# Patient Record
Sex: MALE | Race: WHITE | NOT HISPANIC OR LATINO | Employment: OTHER | URBAN - NONMETROPOLITAN AREA
[De-identification: names, ages, dates, MRNs, and addresses within clinical notes are randomized per-mention and may not be internally consistent; named-entity substitution may affect disease eponyms.]

---

## 2018-08-23 ENCOUNTER — TRANSFERRED RECORDS (OUTPATIENT)
Dept: HEALTH INFORMATION MANAGEMENT | Facility: OTHER | Age: 78
End: 2018-08-23

## 2018-08-26 ENCOUNTER — TRANSFERRED RECORDS (OUTPATIENT)
Dept: HEALTH INFORMATION MANAGEMENT | Facility: OTHER | Age: 78
End: 2018-08-26

## 2018-08-26 ENCOUNTER — MEDICAL CORRESPONDENCE (OUTPATIENT)
Dept: HEALTH INFORMATION MANAGEMENT | Facility: OTHER | Age: 78
End: 2018-08-26

## 2018-08-28 DIAGNOSIS — I48.20 CHRONIC ATRIAL FIBRILLATION (H): ICD-10-CM

## 2018-08-28 DIAGNOSIS — L60.0 INGROWN NAIL: ICD-10-CM

## 2018-08-28 DIAGNOSIS — E11.69 DIABETES MELLITUS TYPE 2 IN OBESE: ICD-10-CM

## 2018-08-28 DIAGNOSIS — C44.91 BASAL CELL CARCINOMA: ICD-10-CM

## 2018-08-28 DIAGNOSIS — R00.8 TRIGEMINY: ICD-10-CM

## 2018-08-28 DIAGNOSIS — I10 HYPERTENSION: ICD-10-CM

## 2018-08-28 DIAGNOSIS — E66.9 DIABETES MELLITUS TYPE 2 IN OBESE: ICD-10-CM

## 2018-08-28 DIAGNOSIS — I49.8 BIGEMINY: ICD-10-CM

## 2018-08-28 DIAGNOSIS — I48.91 ATRIAL FIBRILLATION (H): ICD-10-CM

## 2018-08-28 DIAGNOSIS — E78.5 HYPERLIPIDEMIA: ICD-10-CM

## 2018-08-28 DIAGNOSIS — I50.30 HEART FAILURE WITH PRESERVED EJECTION FRACTION (H): ICD-10-CM

## 2018-08-28 RX ORDER — OMEPRAZOLE/SODIUM BICARBONATE 20MG-1.1G
1 CAPSULE ORAL
COMMUNITY
End: 2021-10-15

## 2018-08-28 RX ORDER — ASCORBIC ACID 500 MG
500 TABLET ORAL 3 TIMES DAILY
COMMUNITY

## 2018-08-28 RX ORDER — LOSARTAN POTASSIUM 50 MG/1
100 TABLET ORAL
COMMUNITY
End: 2021-10-15

## 2018-08-28 RX ORDER — GABAPENTIN 100 MG/1
100 CAPSULE ORAL 3 TIMES DAILY
COMMUNITY

## 2018-08-28 RX ORDER — MAGNESIUM GLUCONATE 30 MG(550)
TABLET ORAL
COMMUNITY
End: 2021-10-15

## 2018-08-28 RX ORDER — FUROSEMIDE 20 MG
20 TABLET ORAL
Status: ON HOLD | COMMUNITY
End: 2018-11-13

## 2018-08-28 RX ORDER — LANOLIN ALCOHOL/MO/W.PET/CERES
400 CREAM (GRAM) TOPICAL
COMMUNITY
End: 2021-10-15

## 2018-08-28 RX ORDER — DIPHENOXYLATE HYDROCHLORIDE AND ATROPINE SULFATE 2.5; .025 MG/1; MG/1
TABLET ORAL
COMMUNITY
End: 2021-10-15

## 2018-08-28 RX ORDER — FLOMAX 0.4 MG/1
0.4 CAPSULE ORAL 2 TIMES DAILY
COMMUNITY

## 2018-08-28 RX ORDER — SIMVASTATIN 10 MG
10 TABLET ORAL
COMMUNITY

## 2018-09-05 ENCOUNTER — OFFICE VISIT (OUTPATIENT)
Dept: UROLOGY | Facility: OTHER | Age: 78
End: 2018-09-05
Attending: UROLOGY
Payer: MEDICARE

## 2018-09-05 VITALS — DIASTOLIC BLOOD PRESSURE: 60 MMHG | WEIGHT: 270 LBS | SYSTOLIC BLOOD PRESSURE: 110 MMHG

## 2018-09-05 DIAGNOSIS — E66.9 DIABETES MELLITUS TYPE 2 IN OBESE: ICD-10-CM

## 2018-09-05 DIAGNOSIS — R33.8 BENIGN PROSTATIC HYPERPLASIA WITH URINARY RETENTION: Primary | ICD-10-CM

## 2018-09-05 DIAGNOSIS — N40.1 BENIGN PROSTATIC HYPERPLASIA WITH URINARY RETENTION: Primary | ICD-10-CM

## 2018-09-05 DIAGNOSIS — E11.69 DIABETES MELLITUS TYPE 2 IN OBESE: ICD-10-CM

## 2018-09-05 PROCEDURE — G0463 HOSPITAL OUTPT CLINIC VISIT: HCPCS

## 2018-09-05 PROCEDURE — 51798 US URINE CAPACITY MEASURE: CPT | Performed by: UROLOGY

## 2018-09-05 PROCEDURE — 99204 OFFICE O/P NEW MOD 45 MIN: CPT | Mod: 25 | Performed by: UROLOGY

## 2018-09-05 PROCEDURE — G0463 HOSPITAL OUTPT CLINIC VISIT: HCPCS | Mod: 25

## 2018-09-05 PROCEDURE — 51700 IRRIGATION OF BLADDER: CPT | Performed by: UROLOGY

## 2018-09-05 RX ORDER — FINASTERIDE 5 MG/1
5 TABLET, FILM COATED ORAL DAILY
Qty: 90 TABLET | Refills: 3 | Status: SHIPPED | OUTPATIENT
Start: 2018-09-05

## 2018-09-05 ASSESSMENT — PAIN SCALES - GENERAL: PAINLEVEL: NO PAIN (0)

## 2018-09-05 NOTE — NURSING NOTE
Pt presents to clinic for consult on urinary retention    Review of Systems:    Weight loss:    Yes     Recent fever/chills:  No   Night sweats:   No  Current skin rash:  No   Recent hair loss:  No  Heat intolerance:  No   Cold intolerance:  No  Chest pain:   Yes   Palpitations:   No  Shortness of breath:  Yes   Wheezing:   Yers  Constipation:    No   Diarrhea:   No   Nausea:   No   Vomiting:   No   Kidney/side pain:  No   Back pain:   No  Frequent headaches:  No   Dizziness:     No  Leg swelling:   Yes   Calf pain:    No    Parents, brothers or sisters with history of kidney cancer:   No  Parents, brothers or sisters with history of bladder cancer: No  Father or brother with history of prostate cancer:  No

## 2018-09-05 NOTE — MR AVS SNAPSHOT
After Visit Summary   9/5/2018    Rupesh Muller    MRN: [id number]           Patient Information     Date Of Birth          1940        Visit Information        Provider Department      9/5/2018 8:30 AM Jarad Richey MD [facility] [place] [facility]        Today's Diagnoses     Benign prostatic hyperplasia with urinary retention    -  1    Diabetes mellitus type 2 in obese (H)           Follow-ups after your visit        Who to contact     If you have questions or need follow up information about today's clinic visit or your schedule please contact [facility] directly at [phone].  Normal or non-critical lab and imaging results will be communicated to you by MyChart, letter or phone within 4 business days after the clinic has received the results. If you do not hear from us within 7 days, please contact the clinic through [employer] or phone. If you have a critical or abnormal lab result, we will notify you by phone as soon as possible.  Submit refill requests through [employer] or call your pharmacy and they will forward the refill request to us. Please allow 3 business days for your refill to be completed.          Additional Information About Your Visit        Care EveryWhere ID     This is your Care EveryWhere ID. This could be used by other organizations to access your [facility] medical records  [id number]         Blood Pressure from Last 3 Encounters:   09/05/18 110/60    Weight from Last 3 Encounters:   09/05/18 122.5 kg (270 lb)              We Performed the Following     IRRIGATION BLADDER SIMPLE LAVAGE/INSTILLATION     POST-VOID RESIDUAL BLADDER SCAN          Today's Medication Changes          These changes are accurate as of 9/5/18 12:28 PM.  If you have any questions, ask your nurse or doctor.               Start taking these medicines.        Dose/Directions    finasteride 5 MG tablet   Commonly known as:  PROSCAR   Used for:  Benign prostatic  hyperplasia with urinary retention   Started by:  Jarad Richey MD        Dose:  5 mg   Take 1 tablet (5 mg) by mouth daily   Quantity:  90 tablet   Refills:  3            Where to get your medicines      These medications were sent to [facility] [address] [facility] [address] [facility] [address] [facility] [address]     Phone:  [phone]     finasteride 5 MG tablet                Primary Care Provider Office Phone # Fax #    Oskar Casiano [phone]       [place] [address] [place] [address]        Equal Access to Services     [facility]: Hadii aad ku hadasho Soomaali, waaxda luqadaha, qaybta kaalmada adeegyada, waxnish martinezin hayaan ademarino moran . So [facility] [phone].    ATENCIÓN: Si habla español, tiene a cardenas disposición servicios gratuitos de asistencia lingüística. Ll[facility] [phone].    We comply with applicable federal civil rights laws and [place] laws. We do not discriminate on the basis of race, color, national origin, age, disability, sex, sexual orientation, or gender identity.            Thank you!     Thank you for choosing [facility] AND [facility]  for your care. Our goal is always to provide you with excellent care. Hearing back from our patients is one way we can continue to improve our services. Please take a few minutes to complete the written survey that you may receive in the mail after your visit with us. Thank you!             Your Updated Medication List - Protect others around you: Learn how to safely use, store and throw away your medicines at www.disposemymeds.org.          This list is accurate as of 9/5/18 12:28 PM.  Always use your most recent med list.                   Brand Name Dispense Instructions for use Diagnosis    ascorbic acid 500 MG tablet    VITAMIN C     Take 500 mg by mouth        finasteride 5 MG tablet    PROSCAR    90 tablet    Take 1 tablet (5 mg) by mouth daily    Benign  prostatic hyperplasia with urinary retention       FLOMAX 0.4 MG capsule   Generic drug:  tamsulosin      Take 0.4 mg by mouth daily        folic acid 400 MCG tablet    FOLVITE     Take 400 mcg by mouth        furosemide 20 MG tablet    LASIX     Take 20 mg by mouth        gabapentin 100 MG capsule    NEURONTIN     Take 100 mg by mouth 3 times daily        losartan 50 MG tablet    COZAAR     Take 50 mg by mouth        metFORMIN 1000 MG tablet    GLUCOPHAGE     Take 1,000 mg by mouth        MULTI-VITAMINS Tabs           omeprazole-sodium bicarbonate  MG Caps per capsule      Take 1 capsule by mouth every morning (before breakfast)        RA POTASSIUM GLUCONATE 595 (99 K) MG Tabs   Generic drug:  Potassium Gluconate           rivaroxaban ANTICOAGULANT 20 MG Tabs tablet    XARELTO     Take 20 mg by mouth        simvastatin 10 MG tablet    ZOCOR     Take 10 mg by mouth

## 2018-09-05 NOTE — PROGRESS NOTES
I was asked to see this patient by Dr Gomez and provide my opinion about the following:  Urinary retention    Type of Visit  Consult    Chief Complaint  Urinary retention    HPI  Mr Muller is a 78 year old male with a history of diabetes among other medical problems who presents with urinary retention.  The patient has had ongoing issues with urinary retention requiring catheterization for the past month or so.  He has undergone multiple voiding trials as well as intermittent catheterization over this timeframe.  Catheter was placed last week.  He has not had catheters placed in the past.  He has not previously had prostate surgery.  He is currently on the following medications for urinary complaints: Waddy.  Records from the outside hospital were reviewed today.      Past Medical History  He  has a past medical history of Atrial fibrillation (H) with ventricular ectopic beats; Basal cell carcinoma nodular type (09/27/2012); Bigeminy; Chronic atrial fibrillation with prolonged pauses; chronic recurrent ingrown toe nails; Diabetes mellitus type 2 in obese (H); Heart failure with preserved ejection fraction (H); Hyperlipidemia; Hypertension; Peripheral neuropathy (08/2006); and Trigeminy epidsodes.  Patient Active Problem List   Diagnosis     Diabetes mellitus type 2 in obese (H)     Hyperlipidemia     Hypertension     chronic recurrent ingrown toe nails     Basal cell carcinoma nodular type     Atrial fibrillation (H) with ventricular ectopic beats     Bigeminy     Trigeminy epidsodes     Chronic atrial fibrillation with prolonged pauses     Heart failure with preserved ejection fraction (H)     Benign prostatic hyperplasia with urinary retention       Past Surgical History  He  has a past surgical history that includes hernia repair (1985) and descended right testicle (1985).    Medications  He has a current medication list which includes the following prescription(s): finasteride, ascorbic acid, flomax, folic  acid, furosemide, gabapentin, losartan, metformin, multi-vitamins, omeprazole-sodium bicarbonate, potassium gluconate, rivaroxaban anticoagulant, and simvastatin.    Allergies  Allergies   Allergen Reactions     No Clinical Screening - See Comments Itching     CT scan in [place] about 30 years ago       Social History  He  reports that he has quit smoking. He has never used smokeless tobacco.  No drug abuse.    Family History  History reviewed. No pertinent family history.    Review of Systems  I personally reviewed the ROS with the patient.    Nursing Notes:   Soheila Lucas LPN  9/5/2018  9:37 AM  Signed  Pt presents to clinic for consult on urinary retention    Review of Systems:    Weight loss:    Yes     Recent fever/chills:  No   Night sweats:   No  Current skin rash:  No   Recent hair loss:  No  Heat intolerance:  No   Cold intolerance:  No  Chest pain:   Yes   Palpitations:   No  Shortness of breath:  Yes   Wheezing:   Yers  Constipation:    No   Diarrhea:   No   Nausea:   No   Vomiting:   No   Kidney/side pain:  No   Back pain:   No  Frequent headaches:  No   Dizziness:     No  Leg swelling:   Yes   Calf pain:    No    Parents, brothers or sisters with history of kidney cancer:   No  Parents, brothers or sisters with history of bladder cancer: No  Father or brother with history of prostate cancer:  No      Soheila Lucas LPN  9/5/2018  9:55 AM  Signed  Void Trial  Verbal order read back by Jarad Richey MD to perform void trial and post void residual bladder scan.  Patient's bladder was filled under gravity with 225mL of sterile saline.  Patient voided 0mL.  Post-void residual was measured by ultrasonic bladder scanner: >302 mL      Soheila Lucas LPN  9/5/2018 11:37 AM  Unsigned  Post-Void Residual  A post-void residual was measured by ultrasonic bladder scanner.  >166 mL      Physical Exam  Vitals:    09/05/18 0835   BP: 110/60   BP Location: Left arm   Patient Position: Sitting   Cuff  Size: Adult Large   Weight: 122.5 kg (270 lb)   Constitutional: No acute distress.  Alert and cooperative   Head: NCAT  Eyes: Conjunctivae normal  Cardiovascular: Regular rate.  Pulmonary/Chest: Respirations are even and non-labored bilaterally, no audible wheezing  Abdominal: Soft. No distension, tenderness, masses or guarding.   Neurological: A + O x 3.  Cranial Nerves II-XII grossly intact.  Extremities: ALINE x 4, Warm. No clubbing.  No cyanosis.    Skin: Pink, warm and dry.  No visible rashes noted.  Psychiatric:  Normal mood and affect  Back:  No left CVA tenderness.  No right CVA tenderness.  Genitourinary:  Catheter in place draining clear urine    Post-Void Residual  A post-void residual was measured by ultrasonic bladder scanner.  166mL    Assessment  Mr Muller is a 78 year old male with a history of diabetes among other medical problems who presents with urinary retention.    Discussed his clinical scenario.  He was able to successfully void today.  I would recommend a PVR at his next primary care visit in the next 1-2 weeks.  Also discussed adding finasteride.    Plan  Continue Flomax  Start finasteride 5 mg once daily  If the catheter needs to be replaced I will see him in follow-up for cystoscopy and plans for possible TURP

## 2018-09-05 NOTE — NURSING NOTE
Void Trial  Verbal order read back by Jarad Richey MD to perform void trial and post void residual bladder scan.  Patient's bladder was filled under gravity with 225mL of sterile saline.  Patient voided 0mL.  Post-void residual was measured by ultrasonic bladder scanner: >302 mL

## 2018-09-17 ENCOUNTER — OFFICE VISIT (OUTPATIENT)
Dept: UROLOGY | Facility: OTHER | Age: 78
End: 2018-09-17
Attending: UROLOGY
Payer: MEDICARE

## 2018-09-17 VITALS — HEART RATE: 64 BPM | RESPIRATION RATE: 16 BRPM | TEMPERATURE: 96.4 F

## 2018-09-17 DIAGNOSIS — R33.9 URINARY RETENTION WITH INCOMPLETE BLADDER EMPTYING: Primary | ICD-10-CM

## 2018-09-17 PROCEDURE — 52000 CYSTOURETHROSCOPY: CPT | Performed by: UROLOGY

## 2018-09-17 PROCEDURE — 88112 CYTOPATH CELL ENHANCE TECH: CPT

## 2018-09-17 PROCEDURE — 99214 OFFICE O/P EST MOD 30 MIN: CPT | Mod: 25 | Performed by: UROLOGY

## 2018-09-17 PROCEDURE — G0463 HOSPITAL OUTPT CLINIC VISIT: HCPCS | Mod: 25

## 2018-09-17 PROCEDURE — 51700 IRRIGATION OF BLADDER: CPT | Performed by: UROLOGY

## 2018-09-17 ASSESSMENT — PAIN SCALES - GENERAL: PAINLEVEL: NO PAIN (0)

## 2018-09-17 NOTE — MR AVS SNAPSHOT
After Visit Summary   9/17/2018    Rupesh Muller    MRN: [id number]           Patient Information     Date Of Birth          1940        Visit Information        Provider Department      9/17/2018 1:00 PM Jarad Richey MD [facility]        Care Instructions    Home Care after Cystoscopy  Follow these guidelines for your care after your procedure.    Activity  No limitations    Bathing or showering  No limitations    Symptoms  You may notice some burning with urination but this usually resolves after 1-2 days.  You may also notice small amounts of blood in your urine.  Please increase water intake for the next few days to help with these symptoms.    Contacts  General Questions: [phone]  Appointments:  [phone]  Emergencies:  [phone]    When to call the clinic  If you develop any of the following symptoms please call the clinic immediately.  If the clinic is closed please be seen at an urgent care clinic or the Emergency Department.  - Burning with urination that worsens after 2 days  - Unable to urinate causing severe pelvic pain  - Fevers of greater than 101 degrees F  - Flank pain that is not responding to pain medication    Follow up  Please follow up as discussed at the appointment.          Follow-ups after your visit        Who to contact     If you have questions or need follow up information about today's clinic visit or your schedule please contact [facility] AND [facility] directly at [phone].  Normal or non-critical lab and imaging results will be communicated to you by MyChart, letter or phone within 4 business days after the clinic has received the results. If you do not hear from us within 7 days, please contact the clinic through MyChart or phone. If you have a critical or abnormal lab result, we will notify you by phone as soon as possible.  Submit refill requests through [employer] or call your pharmacy and they will forward the  refill request to us. Please allow 3 business days for your refill to be completed.          Additional Information About Your Visit        Care EveryWhere ID     This is your Care EveryWhere ID. This could be used by other organizations to access your [facility] medical records  [id number]        Your Vitals Were     Pulse Temperature Respirations             64 96.4  F (35.8  C) (Tympanic) 16          Blood Pressure from Last 3 Encounters:   09/05/18 110/60    Weight from Last 3 Encounters:   09/05/18 122.5 kg (270 lb)              Today, you had the following     No orders found for display       Primary Care Provider Office Phone # Fax #    Oskar Casiano [phone]       [facility] [address] [place] [address]        Equal Access to Services     [facility] : Hadii demetria martin hadasho Soomaali, waaxda luqadaha, qaybta kaalmada adeegyada, waxay juanin lili moran . So [facility] [phone].    ATENCIÓN: Si habla español, tiene a cardenas disposición servicios gratuitos de asistencia lingüística. [facility] [phone].    We comply with applicable federal civil rights laws and [place] laws. We do not discriminate on the basis of race, color, national origin, age, disability, sex, sexual orientation, or gender identity.            Thank you!     Thank you for choosing [facility]  for your care. Our goal is always to provide you with excellent care. Hearing back from our patients is one way we can continue to improve our services. Please take a few minutes to complete the written survey that you may receive in the mail after your visit with us. Thank you!             Your Updated Medication List - Protect others around you: Learn how to safely use, store and throw away your medicines at www.disposemymeds.org.          This list is accurate as of 9/17/18  1:20 PM.  Always use your most recent med list.                   Brand Name Dispense Instructions  for use Diagnosis    ascorbic acid 500 MG tablet    VITAMIN C     Take 500 mg by mouth        finasteride 5 MG tablet    PROSCAR    90 tablet    Take 1 tablet (5 mg) by mouth daily    Benign prostatic hyperplasia with urinary retention       FLOMAX 0.4 MG capsule   Generic drug:  tamsulosin      Take 0.4 mg by mouth daily        folic acid 400 MCG tablet    FOLVITE     Take 400 mcg by mouth        furosemide 20 MG tablet    LASIX     Take 20 mg by mouth        gabapentin 100 MG capsule    NEURONTIN     Take 100 mg by mouth 3 times daily        losartan 50 MG tablet    COZAAR     Take 50 mg by mouth        metFORMIN 1000 MG tablet    GLUCOPHAGE     Take 1,000 mg by mouth        MULTI-VITAMINS Tabs           omeprazole-sodium bicarbonate  MG Caps per capsule      Take 1 capsule by mouth every morning (before breakfast)        RA POTASSIUM GLUCONATE 595 (99 K) MG Tabs   Generic drug:  Potassium Gluconate           rivaroxaban ANTICOAGULANT 20 MG Tabs tablet    XARELTO     Take 20 mg by mouth        simvastatin 10 MG tablet    ZOCOR     Take 10 mg by mouth

## 2018-09-17 NOTE — PROGRESS NOTES
PCP:  Dr Vish Casiano    Preprocedure diagnosis  Urinary retention    Postprocedure diagnosis  Urinary retention  Papillary lesions of bladder (bladder irritation versus tumor)    Procedure  Flexible Cystourethroscopy and bladder barbotage    Surgeon  Jarad Richey MD    Anesthesia  2% lidocaine jelly intraurethrally    Complications  None    Indications  78 year old male undergoing a flexible cystoscopy for the above mentioned indications.  Patient had an indwelling catheter prior to the procedure    Findings  Cystoscopic findings included a normal anterior urethra.    There was not a prominent median lobe.    The lateral lobes were obstructive in appearance.  The bladder appeared to be normal capacity.    There were no tumors, stones or foreign bodies.    The orifices were slit-shaped and in their normal location.    Procedure  The patient was placed in supine position and prepped and draped in sterile fashion with lidocaine jelly per urethra for anesthesia after removing the indwelling catheter.  I passed a lubricated 14F flexible cystoscope through the penile urethra and into the bladder and the bladder was completely visualized.    A bladder barbotage was performed and urine collected and sent for cytology.    The cystoscope was retroflexed and the bladder neck and prostate visualized.    The cystoscope was slowly withdrawn while visualizing the urethra and the procedure terminated.    The patient tolerated the procedure well.      Assessment  Urinary retention -patient is on maximal medical therapy and failed last void trial.  We discussed surgical options and the patient is interested in TURP.  Given his cardiovascular history it would be safest to await the results of upcoming testing.  We also would need to make anticoagulation/antiplatelet plans.    Bladder lesions -in this scenario most likely the bladder lesions represent catheter edema.  However, the appearance of the lesions today did not have  "characteristic catheter edema appearance.  I will await the urine cytology to help with this.    Discussed continuing medications and indwelling catheterization versus TUVP for his urinary retention.  He is unable to perform self catheter position.  The procedure was described to the patient.    It was explained to the patient that due to his urinary retention there is no guarantee he will be able to empty following the prostate procedure.    Possible complications and side effects were discussed with the patient, including but not limited to, infection or sepsis, bleeding, irritative voiding symptoms, injury to the ureter, injury to adjacent organs and incontinence.  Anesthesia related risks were also discussed such as cardiovascular and pulmonary complications.      All patient's questions were answered, and the patient was consented.     Plan  The patient was consented for \"transurethral resection of prostate\" under choice anesthesia following results from his upcoming cardiology appointment.  Preop through PCP is appreciated for perioperative medication management and anticoagulant/antiplatelet planning.          I spent 25 minutes on this patient's visit (exclusive of separately billed services/procedures) and over half of this time was spent in face-to-face counseling regarding his diagnosis, treatment options with emphasis on  risks and benefits of each, prognosis and importance of compliance.    "

## 2018-09-17 NOTE — NURSING NOTE
Patient positioned in supine position, perineum area prepped with chlorhexidene Gluconate and patient draped per sterile technique. Per verbal order read back by Jarad Richey MD remove islas catheter and Urojet 10mL 2% lidocaine jelly to be instilled into urethra.  Islas catheter removed. Urojet- 10ml 2% Lidocaine jelly instilled into the urethra.    Urojet 2%  Lot#: [id number]  Expiration date: 4/20  : [employer]  NDC: [address]-1    [facility] Protocol    A. Pre-procedure verification complete Yes  1-relevant information / documentation available, reviewed and properly matched to the patient; 2-consent accurate and complete, 3-equipment and supplies available    B. Site marking complete N/A  Site marked if not in continuous attendance with patient    C. TIME OUT completed Yes  Time Out was conducted just prior to starting procedure to verify the eight required elements: 1-patient identity, 2-consent accurate and complete, 3-position, 4-correct side/site marked (if applicable), 5-procedure, 6-relevant images / results properly labeled and displayed (if applicable), 7-antibiotics / irrigation fluids (if applicable), 8-safety precautions.    After procedure perineum area rinsed. Discharge instructions reviewed with patient. Patient verbalized understanding of discharge instructions and discharged ambulatory.  Jeanna Haddad..................9/17/2018  1:57 PM

## 2018-09-17 NOTE — PATIENT INSTRUCTIONS

## 2018-09-17 NOTE — LETTER
Oskar Casiano MD  [address] [facility] [address]  [place] [address]        September 17, 2018  MRN: [id number]    Dear Dr. Casiano,          Thank you for allowing me to take part in this patient s care.  I copied my note below from today's clinic visit for your records.  I am hoping to coordinate possible TURP following the completion of his cardiac workup.  I have requested preoperative H&P and I would like to thank you in advance for this as well as assistance with an anticoagulant/antiplatelet plan.  Please feel free to contact me with any questions      Warm regards,            Jarad Richey MD, PharmD, FACS  Urologist  [address] [place] [address]  Appointments: [phone]                          PCP:  Dr Vish Casiano    Preprocedure diagnosis  Urinary retention    Postprocedure diagnosis  Urinary retention  Papillary lesions of bladder (bladder irritation versus tumor)    Procedure  Flexible Cystourethroscopy and bladder barbotage    Surgeon  Jarad Richey MD    Anesthesia  2% lidocaine jelly intraurethrally    Complications  None    Indications  78 year old male undergoing a flexible cystoscopy for the above mentioned indications.  Patient had an indwelling catheter prior to the procedure    Findings  Cystoscopic findings included a normal anterior urethra.    There was not a prominent median lobe.    The lateral lobes were obstructive in appearance.  The bladder appeared to be normal capacity.    There were no tumors, stones or foreign bodies.    The orifices were slit-shaped and in their normal location.    Procedure  The patient was placed in supine position and prepped and draped in sterile fashion with lidocaine jelly per urethra for anesthesia after removing the indwelling catheter.  I passed a lubricated 14F flexible cystoscope through the penile urethra and into the bladder and the bladder was completely visualized.    A bladder barbotage was performed  "and urine collected and sent for cytology.    The cystoscope was retroflexed and the bladder neck and prostate visualized.    The cystoscope was slowly withdrawn while visualizing the urethra and the procedure terminated.    The patient tolerated the procedure well.      Assessment  Urinary retention -patient is on maximal medical therapy and failed last void trial.  We discussed surgical options and the patient is interested in TURP.  Given his cardiovascular history it would be safest to await the results of upcoming testing.  We also would need to make anticoagulation/antiplatelet plans.    Bladder lesions -in this scenario most likely the bladder lesions represent catheter edema.  However, the appearance of the lesions today did not have characteristic catheter edema appearance.  I will await the urine cytology to help with this.    Discussed continuing medications and indwelling catheterization versus TUVP for his urinary retention.  He is unable to perform self catheter position.  The procedure was described to the patient.    It was explained to the patient that due to his urinary retention there is no guarantee he will be able to empty following the prostate procedure.    Possible complications and side effects were discussed with the patient, including but not limited to, infection or sepsis, bleeding, irritative voiding symptoms, injury to the ureter, injury to adjacent organs and incontinence.  Anesthesia related risks were also discussed such as cardiovascular and pulmonary complications.      All patient's questions were answered, and the patient was consented.     Plan  The patient was consented for \"transurethral resection of prostate\" under choice anesthesia following results from his upcoming cardiology appointment.  Preop through PCP is appreciated for perioperative medication management and anticoagulant/antiplatelet planning.  "

## 2018-09-20 ENCOUNTER — TELEPHONE (OUTPATIENT)
Dept: UROLOGY | Facility: OTHER | Age: 78
End: 2018-09-20

## 2018-09-20 NOTE — TELEPHONE ENCOUNTER
aPty is patient's primary care provider's nurse.  She verified patient's full name and date of birth.  She states that patient's pre-op is 9/25/15, echo is 9/26/18 and cardiology appointment is 10/4/18.  Patient is wanting to schedule his surgery date and is requesting cytology results.  Will have Urology  call patient on Monday to schedule surgery pending pre-op clearance.

## 2018-09-24 NOTE — TELEPHONE ENCOUNTER
Patient notified of this (cytology results).  Patient states he would like to wait to schedule surgery until he is cleared from his pre-op.  He will call the Urology  once he is cleared.  Jeanna Haddad RN......September 24, 2018...9:02 AM

## 2018-10-26 ENCOUNTER — TELEPHONE (OUTPATIENT)
Dept: UROLOGY | Facility: OTHER | Age: 78
End: 2018-10-26

## 2018-10-29 NOTE — TELEPHONE ENCOUNTER
Paty esquivel nurse at [facility] verified patient's full name and states patient will follow up with Dr. Meyer tomorrow.  Paty will fax over pre-op clearance once obtained and then Urology  will call patient to reschedule surgery.  Jeanna Haddad RN......October 29, 2018...4:03 PM

## 2018-11-05 ENCOUNTER — TRANSFERRED RECORDS (OUTPATIENT)
Dept: HEALTH INFORMATION MANAGEMENT | Facility: OTHER | Age: 78
End: 2018-11-05

## 2018-11-07 ENCOUNTER — TRANSFERRED RECORDS (OUTPATIENT)
Dept: HEALTH INFORMATION MANAGEMENT | Facility: OTHER | Age: 78
End: 2018-11-07

## 2018-11-12 ENCOUNTER — ANESTHESIA EVENT (OUTPATIENT)
Dept: SURGERY | Facility: OTHER | Age: 78
End: 2018-11-12
Payer: MEDICARE

## 2018-11-12 RX ORDER — FENTANYL CITRATE 50 UG/ML
25-50 INJECTION, SOLUTION INTRAMUSCULAR; INTRAVENOUS
Status: CANCELLED | OUTPATIENT
Start: 2018-11-12

## 2018-11-12 RX ORDER — AMPICILLIN 2 G/1
2 INJECTION, POWDER, FOR SOLUTION INTRAVENOUS
Status: COMPLETED | OUTPATIENT
Start: 2018-11-12 | End: 2018-11-13

## 2018-11-13 ENCOUNTER — SURGERY (OUTPATIENT)
Age: 78
End: 2018-11-13

## 2018-11-13 ENCOUNTER — ANESTHESIA (OUTPATIENT)
Dept: SURGERY | Facility: OTHER | Age: 78
End: 2018-11-13
Payer: MEDICARE

## 2018-11-13 ENCOUNTER — HOSPITAL ENCOUNTER (OUTPATIENT)
Facility: OTHER | Age: 78
Discharge: HOME | End: 2018-11-14
Attending: UROLOGY | Admitting: UROLOGY
Payer: MEDICARE

## 2018-11-13 DIAGNOSIS — R33.8 BENIGN PROSTATIC HYPERPLASIA WITH URINARY RETENTION: Primary | ICD-10-CM

## 2018-11-13 DIAGNOSIS — N40.1 BENIGN PROSTATIC HYPERPLASIA WITH URINARY RETENTION: Primary | ICD-10-CM

## 2018-11-13 PROBLEM — R33.9 URINARY RETENTION: Status: ACTIVE | Noted: 2018-11-13

## 2018-11-13 PROCEDURE — 71000014 ZZH RECOVERY PHASE 1 LEVEL 2 FIRST HR: Performed by: UROLOGY

## 2018-11-13 PROCEDURE — 52601 PROSTATECTOMY (TURP): CPT | Performed by: NURSE ANESTHETIST, CERTIFIED REGISTERED

## 2018-11-13 PROCEDURE — 25000128 H RX IP 250 OP 636: Performed by: NURSE ANESTHETIST, CERTIFIED REGISTERED

## 2018-11-13 PROCEDURE — 25000125 ZZHC RX 250: Performed by: NURSE ANESTHETIST, CERTIFIED REGISTERED

## 2018-11-13 PROCEDURE — 36000058 ZZH SURGERY LEVEL 3 EA 15 ADDTL MIN: Performed by: UROLOGY

## 2018-11-13 PROCEDURE — 27210794 ZZH OR GENERAL SUPPLY STERILE: Performed by: UROLOGY

## 2018-11-13 PROCEDURE — 40000306 ZZH STATISTIC PRE PROC ASSESS II: Performed by: UROLOGY

## 2018-11-13 PROCEDURE — 25000566 ZZH SEVOFLURANE, EA 15 MIN: Performed by: UROLOGY

## 2018-11-13 PROCEDURE — 25000125 ZZHC RX 250: Performed by: UROLOGY

## 2018-11-13 PROCEDURE — 25000128 H RX IP 250 OP 636: Performed by: UROLOGY

## 2018-11-13 PROCEDURE — 27211024 ZZHC OR SUPPLY OTHER OPNP: Performed by: UROLOGY

## 2018-11-13 PROCEDURE — 52601 PROSTATECTOMY (TURP): CPT | Performed by: UROLOGY

## 2018-11-13 PROCEDURE — 37000009 ZZH ANESTHESIA TECHNICAL FEE, EACH ADDTL 15 MIN: Performed by: UROLOGY

## 2018-11-13 PROCEDURE — 99100 ANES PT EXTEME AGE<1 YR&>70: CPT | Performed by: NURSE ANESTHETIST, CERTIFIED REGISTERED

## 2018-11-13 PROCEDURE — 36000056 ZZH SURGERY LEVEL 3 1ST 30 MIN: Performed by: UROLOGY

## 2018-11-13 PROCEDURE — 88305 TISSUE EXAM BY PATHOLOGIST: CPT

## 2018-11-13 PROCEDURE — 25800025 ZZH RX 258: Performed by: UROLOGY

## 2018-11-13 PROCEDURE — 37000008 ZZH ANESTHESIA TECHNICAL FEE, 1ST 30 MIN: Performed by: UROLOGY

## 2018-11-13 RX ORDER — LIDOCAINE 40 MG/G
CREAM TOPICAL
Status: DISCONTINUED | OUTPATIENT
Start: 2018-11-13 | End: 2018-11-14 | Stop reason: HOSPADM

## 2018-11-13 RX ORDER — ONDANSETRON 2 MG/ML
4 INJECTION INTRAMUSCULAR; INTRAVENOUS EVERY 30 MIN PRN
Status: DISCONTINUED | OUTPATIENT
Start: 2018-11-13 | End: 2018-11-13 | Stop reason: HOSPADM

## 2018-11-13 RX ORDER — PROCHLORPERAZINE MALEATE 5 MG
5 TABLET ORAL EVERY 6 HOURS PRN
Status: DISCONTINUED | OUTPATIENT
Start: 2018-11-13 | End: 2018-11-14 | Stop reason: HOSPADM

## 2018-11-13 RX ORDER — ONDANSETRON 4 MG/1
4 TABLET, ORALLY DISINTEGRATING ORAL EVERY 30 MIN PRN
Status: DISCONTINUED | OUTPATIENT
Start: 2018-11-13 | End: 2018-11-13 | Stop reason: HOSPADM

## 2018-11-13 RX ORDER — TORSEMIDE 10 MG/1
20 TABLET ORAL DAILY
Status: DISCONTINUED | OUTPATIENT
Start: 2018-11-13 | End: 2018-11-14

## 2018-11-13 RX ORDER — FENTANYL CITRATE 50 UG/ML
25-50 INJECTION, SOLUTION INTRAMUSCULAR; INTRAVENOUS
Status: DISCONTINUED | OUTPATIENT
Start: 2018-11-13 | End: 2018-11-13 | Stop reason: HOSPADM

## 2018-11-13 RX ORDER — NEOMYCIN/BACITRACIN/POLYMYXINB 3.5-400-5K
OINTMENT (GRAM) TOPICAL 4 TIMES DAILY PRN
Status: DISCONTINUED | OUTPATIENT
Start: 2018-11-13 | End: 2018-11-14 | Stop reason: HOSPADM

## 2018-11-13 RX ORDER — PROPOFOL 10 MG/ML
INJECTION, EMULSION INTRAVENOUS PRN
Status: DISCONTINUED | OUTPATIENT
Start: 2018-11-13 | End: 2018-11-13

## 2018-11-13 RX ORDER — LIDOCAINE HYDROCHLORIDE 20 MG/ML
INJECTION, SOLUTION INFILTRATION; PERINEURAL PRN
Status: DISCONTINUED | OUTPATIENT
Start: 2018-11-13 | End: 2018-11-13

## 2018-11-13 RX ORDER — NALOXONE HYDROCHLORIDE 0.4 MG/ML
.1-.4 INJECTION, SOLUTION INTRAMUSCULAR; INTRAVENOUS; SUBCUTANEOUS
Status: DISCONTINUED | OUTPATIENT
Start: 2018-11-13 | End: 2018-11-13 | Stop reason: HOSPADM

## 2018-11-13 RX ORDER — MEPERIDINE HYDROCHLORIDE 50 MG/ML
12.5 INJECTION INTRAMUSCULAR; INTRAVENOUS; SUBCUTANEOUS
Status: DISCONTINUED | OUTPATIENT
Start: 2018-11-13 | End: 2018-11-13 | Stop reason: HOSPADM

## 2018-11-13 RX ORDER — ONDANSETRON 2 MG/ML
4 INJECTION INTRAMUSCULAR; INTRAVENOUS EVERY 6 HOURS PRN
Status: DISCONTINUED | OUTPATIENT
Start: 2018-11-13 | End: 2018-11-14 | Stop reason: HOSPADM

## 2018-11-13 RX ORDER — HYDROMORPHONE HYDROCHLORIDE 1 MG/ML
0.2 INJECTION, SOLUTION INTRAMUSCULAR; INTRAVENOUS; SUBCUTANEOUS
Status: DISCONTINUED | OUTPATIENT
Start: 2018-11-13 | End: 2018-11-14 | Stop reason: HOSPADM

## 2018-11-13 RX ORDER — SIMVASTATIN 10 MG
10 TABLET ORAL AT BEDTIME
Status: DISCONTINUED | OUTPATIENT
Start: 2018-11-13 | End: 2018-11-14

## 2018-11-13 RX ORDER — ONDANSETRON 2 MG/ML
INJECTION INTRAMUSCULAR; INTRAVENOUS PRN
Status: DISCONTINUED | OUTPATIENT
Start: 2018-11-13 | End: 2018-11-13

## 2018-11-13 RX ORDER — FENTANYL CITRATE 50 UG/ML
INJECTION, SOLUTION INTRAMUSCULAR; INTRAVENOUS PRN
Status: DISCONTINUED | OUTPATIENT
Start: 2018-11-13 | End: 2018-11-13

## 2018-11-13 RX ORDER — SODIUM CHLORIDE, SODIUM LACTATE, POTASSIUM CHLORIDE, CALCIUM CHLORIDE 600; 310; 30; 20 MG/100ML; MG/100ML; MG/100ML; MG/100ML
INJECTION, SOLUTION INTRAVENOUS CONTINUOUS
Status: DISCONTINUED | OUTPATIENT
Start: 2018-11-13 | End: 2018-11-13 | Stop reason: HOSPADM

## 2018-11-13 RX ORDER — LIDOCAINE 40 MG/G
CREAM TOPICAL
Status: DISCONTINUED | OUTPATIENT
Start: 2018-11-13 | End: 2018-11-13 | Stop reason: HOSPADM

## 2018-11-13 RX ORDER — HYDROCODONE BITARTRATE AND ACETAMINOPHEN 5; 325 MG/1; MG/1
TABLET ORAL
Qty: 15 TABLET | Refills: 0 | Status: SHIPPED | OUTPATIENT
Start: 2018-11-13 | End: 2018-11-14

## 2018-11-13 RX ORDER — HYDROCODONE BITARTRATE AND ACETAMINOPHEN 5; 325 MG/1; MG/1
1-2 TABLET ORAL EVERY 4 HOURS PRN
Status: DISCONTINUED | OUTPATIENT
Start: 2018-11-13 | End: 2018-11-14 | Stop reason: HOSPADM

## 2018-11-13 RX ORDER — ONDANSETRON 4 MG/1
4 TABLET, ORALLY DISINTEGRATING ORAL EVERY 6 HOURS PRN
Status: DISCONTINUED | OUTPATIENT
Start: 2018-11-13 | End: 2018-11-14 | Stop reason: HOSPADM

## 2018-11-13 RX ORDER — SODIUM CHLORIDE 9 MG/ML
INJECTION, SOLUTION INTRAVENOUS CONTINUOUS
Status: DISCONTINUED | OUTPATIENT
Start: 2018-11-13 | End: 2018-11-13 | Stop reason: HOSPADM

## 2018-11-13 RX ORDER — METOCLOPRAMIDE HYDROCHLORIDE 5 MG/ML
5 INJECTION INTRAMUSCULAR; INTRAVENOUS EVERY 6 HOURS PRN
Status: DISCONTINUED | OUTPATIENT
Start: 2018-11-13 | End: 2018-11-14 | Stop reason: HOSPADM

## 2018-11-13 RX ORDER — NALOXONE HYDROCHLORIDE 0.4 MG/ML
.1-.4 INJECTION, SOLUTION INTRAMUSCULAR; INTRAVENOUS; SUBCUTANEOUS
Status: DISCONTINUED | OUTPATIENT
Start: 2018-11-13 | End: 2018-11-14 | Stop reason: HOSPADM

## 2018-11-13 RX ORDER — GABAPENTIN 300 MG/1
300 CAPSULE ORAL
Status: DISCONTINUED | OUTPATIENT
Start: 2018-11-13 | End: 2018-11-14

## 2018-11-13 RX ORDER — METOCLOPRAMIDE 5 MG/1
5 TABLET ORAL EVERY 6 HOURS PRN
Status: DISCONTINUED | OUTPATIENT
Start: 2018-11-13 | End: 2018-11-14 | Stop reason: HOSPADM

## 2018-11-13 RX ORDER — FUROSEMIDE 20 MG
20 TABLET ORAL EVERY MORNING
Status: DISCONTINUED | OUTPATIENT
Start: 2018-11-14 | End: 2018-11-13 | Stop reason: CLARIF

## 2018-11-13 RX ADMIN — FENTANYL CITRATE 25 MCG: 50 INJECTION, SOLUTION INTRAMUSCULAR; INTRAVENOUS at 11:52

## 2018-11-13 RX ADMIN — FENTANYL CITRATE 50 MCG: 50 INJECTION, SOLUTION INTRAMUSCULAR; INTRAVENOUS at 11:59

## 2018-11-13 RX ADMIN — FENTANYL CITRATE 25 MCG: 50 INJECTION, SOLUTION INTRAMUSCULAR; INTRAVENOUS at 11:56

## 2018-11-13 RX ADMIN — SODIUM CHLORIDE 3000 ML: 900 IRRIGANT IRRIGATION at 12:00

## 2018-11-13 RX ADMIN — PROPOFOL 200 MG: 10 INJECTION, EMULSION INTRAVENOUS at 11:36

## 2018-11-13 RX ADMIN — LIDOCAINE HYDROCHLORIDE 100 MG: 20 INJECTION, SOLUTION INFILTRATION; PERINEURAL at 11:36

## 2018-11-13 RX ADMIN — AMPICILLIN SODIUM 2 G: 2 INJECTION, POWDER, FOR SOLUTION INTRAMUSCULAR; INTRAVENOUS at 11:38

## 2018-11-13 RX ADMIN — SODIUM CHLORIDE: 900 INJECTION, SOLUTION INTRAVENOUS at 11:31

## 2018-11-13 RX ADMIN — LIDOCAINE HYDROCHLORIDE 0.1 ML: 10 INJECTION, SOLUTION EPIDURAL; INFILTRATION; INTRACAUDAL; PERINEURAL at 10:00

## 2018-11-13 RX ADMIN — ONDANSETRON 4 MG: 2 INJECTION INTRAMUSCULAR; INTRAVENOUS at 11:36

## 2018-11-13 RX ADMIN — ATROPA BELLADONNA AND OPIUM 30 MG: 16.2; 3 SUPPOSITORY RECTAL at 11:45

## 2018-11-13 RX ADMIN — FENTANYL CITRATE 25 MCG: 50 INJECTION, SOLUTION INTRAMUSCULAR; INTRAVENOUS at 11:44

## 2018-11-13 RX ADMIN — SODIUM CHLORIDE 10 ML/HR: 900 INJECTION, SOLUTION INTRAVENOUS at 10:00

## 2018-11-13 RX ADMIN — GENTAMICIN SULFATE 470 MG: 40 INJECTION, SOLUTION INTRAMUSCULAR; INTRAVENOUS at 10:23

## 2018-11-13 RX ADMIN — FENTANYL CITRATE 25 MCG: 50 INJECTION, SOLUTION INTRAMUSCULAR; INTRAVENOUS at 11:36

## 2018-11-13 RX ADMIN — FENTANYL CITRATE 25 MCG: 50 INJECTION, SOLUTION INTRAMUSCULAR; INTRAVENOUS at 11:46

## 2018-11-13 RX ADMIN — FENTANYL CITRATE 25 MCG: 50 INJECTION, SOLUTION INTRAMUSCULAR; INTRAVENOUS at 12:01

## 2018-11-13 ASSESSMENT — ACTIVITIES OF DAILY LIVING (ADL)
TOILETING: 1-->ASSISTIVE EQUIPMENT
SWALLOWING: 0 - SWALLOWS FOODS/LIQUIDS WITHOUT DIFFICULTY
TOILETING: 3 - ASSISTIVE EQUIPMENT AND PERSON
DRESS: 0 - INDEPENDENT
BATHING: 3 - ASSISTIVE EQUIPMENT AND PERSON
RETIRED_EATING: 0-->INDEPENDENT
SWALLOWING: 0-->SWALLOWS FOODS/LIQUIDS WITHOUT DIFFICULTY
AMBULATION: 1-->ASSISTIVE EQUIPMENT
EATING: 0 - INDEPENDENT
CHANGE_IN_FUNCTIONAL_STATUS_SINCE_ONSET_OF_CURRENT_ILLNESS/INJURY: NO
COMMUNICATION: 0 - UNDERSTANDS/COMMUNICATES WITHOUT DIFFICULTY
TRANSFERRING: 1-->ASSISTIVE EQUIPMENT
AMBULATION: 3 - ASSISTIVE EQUIPMENT AND PERSON
COGNITION: 0 - NO COGNITION ISSUES REPORTED
RETIRED_COMMUNICATION: 0-->UNDERSTANDS/COMMUNICATES WITHOUT DIFFICULTY
BATHING: 1-->ASSISTIVE EQUIPMENT
DRESS: 0-->INDEPENDENT
FALL_HISTORY_WITHIN_LAST_SIX_MONTHS: NO
TRANSFERRING: 3 - ASSISTIVE EQUIPMENT AND PERSON

## 2018-11-13 NOTE — OP NOTE
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 including urinary retention    Postoperative diagnosis  Clinical BPH with lower urinary tract symptoms including urinary retention    Procedure performed  Transurethral resection of prostate, bipolar    Surgeon  Jarad Richey MD    Surgeon(s)/Proceduralist(s) and Assistants (if any)  Surgeon(s):  Jarad Richey MD  Circulator: Isidra Patricio RN; Swati Lopez RN  Scrub Person: Neelima Corrigan  First Assistant: Shelbie Caballero RN    Specimen(s)  Yes, prostate chips    (EBL) Estimated blood loss (ml)  10    Anesthesia  General    Complications  None    Findings  EUA revealed 40g prostate, no nodules    Cystoscopy revealed trilobar prostatic hyperplasia.  There were no abnormalities within the bladder with no bladder tumors or stones.  Ureteral orifices were in the normal anatomic position.  Post-procedure cystoscopy revealed bilateral ureteral orifices that were uninvolved in the resection.  Resection did not extend distal to the verumontanum.    Indications  78 year old male agreed to undergo the above named procedure after discussion of the alternatives, risks and benefits.    He was found to have significant lower urinary tract symptoms consistent with clinical BPH.  These were refractory to oral medication.  Informed consent was obtained.      Procedure  The patient was taken to the operating room and placed supine on the operating table.  Pre-operative antibiotics were administered.  Bilateral lower extremity SCDs were placed.  After induction of anesthesia the patient was positioned in dorsal lithotomy.  A time-out was performed.  An exam under anesthesia was performed with the above described findings.  The patient was prepped and draped in a sterile fashion.      Serial dilation of the meatus was performed to 30 Latvian in order to allow the scope to safely be passed through the urethra.    A 27 Latvian continuous flow resectoscope was  introduced into the urethra and bladder.  Using the band working element, the prostate was treated in a systematic fashion.  First the left and right lateral lobes were treated followed by the median lobe.  Hemostasis was assured throughout the case.  The bilateral ureteral orifices and verumontanum were used as landmarks and repeatedly visualized throughout the procedure.  Care was taken to carry the treatment of the prostate up to, but not distal to, the verumontanum.  A 20 Upper sorbian catheter was placed without difficulty.      The patient tolerated the procedure well, was awakened and transferred to the recovery room in stable condition.    Plan  Admit overnight for observation  Follow up in clinic for an early am appt in 1 week on a Tuesday.

## 2018-11-13 NOTE — ANESTHESIA PREPROCEDURE EVALUATION
Anesthesia Evaluation     . Pt has had prior anesthetic.            ROS/MED HX    ENT/Pulmonary:     (+)LAURA risk factors snores loudly, hypertension, obese, , . .    Neurologic: Comment: Uses a walker    (+)neuropathy     Cardiovascular:     (+) hypertension----. : . CHF Last EF: 60% . . pacemaker :. valvular problems/murmurs . pulmonary hypertension, Previous cardiac testing Echodate:results:date: results: date: results:Cath date: results:          METS/Exercise Tolerance:  1 - Eating, dressing   Hematologic: Comments: Stopped Xarelto on Saturday        Musculoskeletal:         GI/Hepatic:         Renal/Genitourinary: Comment: Has a islas at this time.    (+) BPH,       Endo:     (+) type I DM, Obesity, .      Psychiatric:         Infectious Disease:  - neg infectious disease ROS       Malignancy:      - no malignancy   Other:    - neg other ROS                 Physical Exam      Airway   Mallampati: III  TM distance: <3 FB  Neck ROM: full  Comment: Short thyromental distance, recessed chin    Dental   Comment: Worn teeth    Cardiovascular   Rhythm and rate: regular and normal      Pulmonary    breath sounds clear to auscultation    Other findings: Has lost fluid weight of 40lb in the last few months with better medication management.  His activity level is low with a walker. Had a pleural effusion with 900 ml drained.  No use of oxygen recently at home.  He is not sob when talking with me and denies any chest pain.  Sats 92% on room air today.                  Anesthesia Plan      History & Physical Review      ASA Status:  4 .    NPO Status:  > 6 hours    Plan for LMA with Propofol induction. Maintenance will be Balanced.    PONV prophylaxis:  Ondansetron (or other 5HT-3) and Dexamethasone or Solumedrol  Cardiology cleared him for surgery as he has improved since his pericardial effusion has resolved.  No edema to lower legs.        Postoperative Care  Postoperative pain management:  IV analgesics, Oral pain  medications and Multi-modal analgesia.      Consents  Anesthetic plan, risks, benefits and alternatives discussed with:  Patient and Daughter/Son..                          .

## 2018-11-13 NOTE — PROGRESS NOTES
Nurse to nurse from JOHNATHON Encarnacion in PACU. All questions answered. Ольга De Leon RN on 11/13/2018 at 12:59 PM

## 2018-11-13 NOTE — OR NURSING
PACU Transfer Note    Rupesh Muller was transferred to [facility] via cart.  Equipment used for transport:  None Accompanied by:  RN     PACU Respiratory Event Documentation     1) Episodes of Apnea greater than or equal to 10 seconds: no    2) Bradypnea - less than 8 breaths per minute: no    3) Pain score on 0 to 10 scale: no    4) Pain-sedation mismatch (yes or no): no    5) Repeated 02 desaturation less than 90% (yes or no): no    Anesthesia notified? (yes or no): no report to [facility]    Any of the above events occuring repeatedly in separate 30 minute intervals may be considered recurrent PACU respiratory events.    Patient stable and meets phase 1 discharge criteria for transport from PACU.

## 2018-11-13 NOTE — IP AVS SNAPSHOT
MRN:[id number]                      After Visit Summary   11/13/2018    Rupesh Muller    MRN: [id number]           Thank you!     Thank you for choosing [facility] for your care. Our goal is always to provide you with excellent care. Hearing back from our patients is one way we can continue to improve our services. Please take a few minutes to complete the written survey that you may receive in the mail after you visit with us. Thank you!        Patient Information     Date Of Birth          1940        Designated Caregiver       Most Recent Value    Caregiver    Will someone help with your care after discharge? yes    Name of designated caregiver Helen Spouse    Phone number of caregiver [phone]    Caregiver address [place]      About your hospital stay     You were admitted on:  November 13, 2018 You last received care in the:  [facility]    You were discharged on:  November 14, 2018       Who to Call     For medical emergencies, please call 911.  For non-urgent questions about your medical care, please call your primary care provider or clinic, [phone]  For questions related to your surgery, please call your surgery clinic        Attending Provider     Provider Specialty    Jarad Richey MD Urology       Primary Care Provider Office Phone # Fax #    Oskar SANKET Casiano [phone]      After Care Instructions     Diet Instructions       Resume pre procedure diet            Discharge Instructions       Follow up appointment next Tuesday for void trial            Encourage fluids       Encourage fluids at home to keep urine clear to light pink            No Alcohol       for 24 hours post procedure            No driving or operating machinery        until the day after procedure                  Your next 10 appointments already scheduled     Nov 20, 2018  8:15 AM CST   Nurse Only with  UROLOGY NURSE   [facility] ([facility]  "[facility] [place])    [address]  [place] [address]   [phone]              Pending Results     No orders found for last 3 day(s).            Admission Information     Date & Time Provider Department Dept. Phone    11/13/2018 Jarad Richey MD [facility] [phone]      Your Vitals Were     Blood Pressure Temperature Respirations Height Weight Pulse Oximetry    145/61 (BP Location: Left arm) 97.7  F (36.5  C) (Tympanic) 16 1.803 m (5' 11\") 108.4 kg (238 lb 15.7 oz) 90%    BMI (Body Mass Index)                   33.33 kg/m2           Care EveryWhere ID     This is your Care EveryWhere ID. This could be used by other organizations to access your [facility] medical records  [id number]        Equal Access to Services     CHARLES MAR : Nneka Moran, kenia ortiz, sylvia reynoso, jeri melara. So [facility] [phone].    ATENCIÓN: Si habla español, tiene a cardenas disposición servicios gratuitos de asistencia lingüística. Barbara al [phone].    We comply with applicable federal civil rights laws and [place] laws. We do not discriminate on the basis of race, color, national origin, age, disability, sex, sexual orientation, or gender identity.               Review of your medicines      UNREVIEWED medicines. Ask your doctor about these medicines        Dose / Directions    ascorbic acid 500 MG tablet   Commonly known as:  VITAMIN C        Dose:  500 mg   Take 500 mg by mouth 2 times daily   Refills:  0       finasteride 5 MG tablet   Commonly known as:  PROSCAR        Dose:  5 mg   Take 1 tablet (5 mg) by mouth daily   Quantity:  90 tablet   Refills:  3       FLOMAX 0.4 MG capsule   Generic drug:  tamsulosin        Dose:  0.4 mg   Take 0.4 mg by mouth daily   Refills:  0       folic acid 400 MCG tablet   Commonly known as:  FOLVITE        Dose:  400 mcg   Take 400 mcg by mouth   Refills:  0       gabapentin 100 " MG capsule   Commonly known as:  NEURONTIN        Dose:  300 mg   Take 300 mg by mouth 2 times daily   Refills:  0       losartan 50 MG tablet   Commonly known as:  COZAAR        Dose:  100 mg   Take 100 mg by mouth   Refills:  0       metFORMIN 1000 MG tablet   Commonly known as:  GLUCOPHAGE        Dose:  1000 mg   Take 1,000 mg by mouth 2 times daily (with meals)   Refills:  0       MULTI-VITAMINS Tabs        Refills:  0       NONFORMULARY   Indication:  magnesium sulfate 250 mg 1 tab daily        Refills:  0       omeprazole-sodium bicarbonate  MG Caps per capsule        Dose:  1 capsule   Take 1 capsule by mouth every morning (before breakfast)   Refills:  0       RA POTASSIUM GLUCONATE 595 (99 K) MG Tabs   Generic drug:  Potassium Gluconate        Refills:  0       rivaroxaban ANTICOAGULANT 20 MG Tabs tablet   Commonly known as:  XARELTO        Dose:  20 mg   Take 20 mg by mouth   Refills:  0       simvastatin 10 MG tablet   Commonly known as:  ZOCOR        Dose:  10 mg   Take 10 mg by mouth   Refills:  0       TORSEMIDE PO        Dose:  20 mg   Take 20 mg by mouth Two tabs in AM and one at 2 pm   Refills:  0       UNABLE TO FIND        Dose:  1 capsule   1 capsule daily MEDICATION NAME:  [employer]ate[employer] oral cap.s (dha-Epa-flaxseed oil-Vitamin E   Refills:  0         START taking        Dose / Directions    HYDROcodone-acetaminophen 5-325 MG per tablet   Commonly known as:  NORCO        Take 1-2 tablets by mouth every 4-6 hours prn pain   Quantity:  15 tablet   Refills:  0            Where to get your medicines      Some of these will need a paper prescription and others can be bought over the counter. Ask your nurse if you have questions.     Bring a paper prescription for each of these medications     HYDROcodone-acetaminophen 5-325 MG per tablet                Protect others around you: Learn how to safely use, store and throw away your medicines at www.disposemymeds.org.        Information  about OPIOIDS     PRESCRIPTION OPIOIDS: WHAT YOU NEED TO KNOW   We gave you an opioid (narcotic) pain medicine. It is important to manage your pain, but opioids are not always the best choice. You should first try all the other options your care team gave you. Take this medicine for as short a time (and as few doses) as possible.    Some activities can increase your pain, such as bandage changes or therapy sessions. It may help to take your pain medicine 30 to 60 minutes before these activities. Reduce your stress by getting enough sleep, working on hobbies you enjoy and practicing relaxation or meditation. Talk to your care team about ways to manage your pain beyond prescription opioids.    These medicines have risks:    DO NOT drive when on new or higher doses of pain medicine. These medicines can affect your alertness and reaction times, and you could be arrested for driving under the influence (DUI). If you need to use opioids long-term, talk to your care team about driving.    DO NOT operate heavy machinery    DO NOT do any other dangerous activities while taking these medicines.    DO NOT drink any alcohol while taking these medicines.     If the opioid prescribed includes acetaminophen, DO NOT take with any other medicines that contain acetaminophen. Read all labels carefully. Look for the word  acetaminophen  or  Tylenol.  Ask your pharmacist if you have questions or are unsure.    You can get addicted to pain medicines, especially if you have a history of addiction (chemical, alcohol or substance dependence). Talk to your care team about ways to reduce this risk.    All opioids tend to cause constipation. Drink plenty of water and eat foods that have a lot of fiber, such as fruits, vegetables, prune juice, apple juice and high-fiber cereal. Take a laxative (Miralax, milk of magnesia, Colace, Senna) if you don t move your bowels at least every other day. Other side effects include upset stomach, sleepiness,  dizziness, throwing up, tolerance (needing more of the medicine to have the same effect), physical dependence and slowed breathing.    Store your pills in a secure place, locked if possible. We will not replace any lost or stolen medicine. If you don t finish your medicine, please throw away (dispose) as directed by your pharmacist. The [place] Pollution Control Agency has more information about safe disposal: https://www.pca.[facility].[place].us/living-green/managing-unwanted-medications             Medication List: This is a list of all your medications and when to take them. Check marks below indicate your daily home schedule. Keep this list as a reference.      Medications           Morning Afternoon Evening Bedtime As Needed    ascorbic acid 500 MG tablet   Commonly known as:  VITAMIN C   Take 500 mg by mouth 2 times daily                                finasteride 5 MG tablet   Commonly known as:  PROSCAR   Take 1 tablet (5 mg) by mouth daily                                FLOMAX 0.4 MG capsule   Take 0.4 mg by mouth daily   Generic drug:  tamsulosin                                folic acid 400 MCG tablet   Commonly known as:  FOLVITE   Take 400 mcg by mouth                                gabapentin 100 MG capsule   Commonly known as:  NEURONTIN   Take 300 mg by mouth 2 times daily                                HYDROcodone-acetaminophen 5-325 MG per tablet   Commonly known as:  NORCO   Take 1-2 tablets by mouth every 4-6 hours prn pain   Last time this was given:  1 tablet on 11/14/2018  5:12 AM                                losartan 50 MG tablet   Commonly known as:  COZAAR   Take 100 mg by mouth                                metFORMIN 1000 MG tablet   Commonly known as:  GLUCOPHAGE   Take 1,000 mg by mouth 2 times daily (with meals)                                MULTI-VITAMINS Tabs                                NONFORMULARY                                omeprazole-sodium bicarbonate  MG Caps per  capsule   Take 1 capsule by mouth every morning (before breakfast)                                RA POTASSIUM GLUCONATE 595 (99 K) MG Tabs   Generic drug:  Potassium Gluconate                                rivaroxaban ANTICOAGULANT 20 MG Tabs tablet   Commonly known as:  XARELTO   Take 20 mg by mouth                                simvastatin 10 MG tablet   Commonly known as:  ZOCOR   Take 10 mg by mouth                                TORSEMIDE PO   Take 20 mg by mouth Two tabs in AM and one at 2 pm                                UNABLE TO FIND   1 capsule daily MEDICATION NAME:  Theratears Nutrition oral cap.s (dha-Epa-flaxseed oil-Vitamin E

## 2018-11-13 NOTE — PROGRESS NOTES
Patient admitted to [facility] from PACU. VSS. Patient A&O x 4. Denies pain. Ольга De Leon RN on 11/13/2018 at 1:20 PM

## 2018-11-13 NOTE — ANESTHESIA CARE TRANSFER NOTE
Patient: Rupesh Muller    Procedure(s):  Transurethral Resection of Prostate    Diagnosis: benign prostatic hypertrophy w/ obstruction  Diagnosis Additional Information: No value filed.    Anesthesia Type:   LMA     Note:  Airway :Nasal Cannula  Patient transferred to:PACU  Handoff Report: Identifed the Patient, Identified the Reponsible Provider, Reviewed the pertinent medical history, Discussed the surgical course, Reviewed Intra-OP anesthesia mangement and issues during anesthesia, Set expectations for post-procedure period and Allowed opportunity for questions and acknowledgement of understanding      Vitals: (Last set prior to Anesthesia Care Transfer)    CRNA VITALS  11/13/2018 1135 - 11/13/2018 1209      11/13/2018             Resp Rate (set): 10                Electronically Signed By: PAULINA Jessica CRNA  November 13, 2018  12:09 PM

## 2018-11-13 NOTE — PHARMACY-ADMISSION MEDICATION HISTORY
"Pharmacy -- Admission Medication Reconciliation    Prior to admission (PTA) medications were reviewed and the patient's PTA medication list was updated.    Sources Consulted: [facility] refill history, chart review, Care Everywhere, [employer]    The reliability of this Medication Reconciliation is: Reliability: Unreliable    The following significant changes were made:  1. Removed furosemide - no recent fills last dose documented as \"patient does not have\"    The pharmacist has reviewed with the patient that all personal medications should be removed from the building or locked in the belongings safe.  Patient shall only take medications ordered by the physician and administered by the nursing staff.     Medication barriers identified: Patient states that his son sets up all of his medicaitons. Son not available for interview at this time.    Medication adherence concerns: not assessed at this time.    Understanding of emergency medications: [facility], 11/13/2018,  3:40 PM     "

## 2018-11-13 NOTE — ANESTHESIA POSTPROCEDURE EVALUATION
Patient: Rupesh Muller    Procedure(s):  Transurethral Resection of Prostate    Diagnosis:benign prostatic hypertrophy w/ obstruction  Diagnosis Additional Information: No value filed.    Anesthesia Type:  LMA    Note:  Anesthesia Post Evaluation    Patient location during evaluation: PACU  Patient participation: Able to fully participate in evaluation  Level of consciousness: awake and alert  Pain management: adequate  Airway patency: patent  Cardiovascular status: acceptable  Respiratory status: acceptable  Hydration status: acceptable  PONV: none             Last vitals:  Vitals:    11/13/18 0910 11/13/18 1206 11/13/18 1210   BP:  104/56 (!) 84/53   Resp:   15   Temp:  97  F (36.1  C)    SpO2: 92%           Electronically Signed By: PAULINA BOSTON CRNA  November 13, 2018  12:21 PM

## 2018-11-14 VITALS
SYSTOLIC BLOOD PRESSURE: 133 MMHG | DIASTOLIC BLOOD PRESSURE: 65 MMHG | TEMPERATURE: 97.4 F | HEIGHT: 71 IN | OXYGEN SATURATION: 92 % | RESPIRATION RATE: 16 BRPM | BODY MASS INDEX: 33.46 KG/M2 | WEIGHT: 238.98 LBS

## 2018-11-14 PROCEDURE — A9270 NON-COVERED ITEM OR SERVICE: HCPCS | Mod: GY | Performed by: UROLOGY

## 2018-11-14 PROCEDURE — 25000132 ZZH RX MED GY IP 250 OP 250 PS 637: Mod: GY | Performed by: UROLOGY

## 2018-11-14 RX ORDER — HYDROCODONE BITARTRATE AND ACETAMINOPHEN 5; 325 MG/1; MG/1
TABLET ORAL
Qty: 15 TABLET | Refills: 0
Start: 2018-11-14 | End: 2021-10-15

## 2018-11-14 RX ADMIN — HYDROCODONE BITARTRATE AND ACETAMINOPHEN 1 TABLET: 5; 325 TABLET ORAL at 00:53

## 2018-11-14 RX ADMIN — HYDROCODONE BITARTRATE AND ACETAMINOPHEN 1 TABLET: 5; 325 TABLET ORAL at 05:12

## 2018-11-14 NOTE — PHARMACY - DISCHARGE MEDICATION RECONCILIATION AND EDUCATION
Pharmacy:  Discharge Counseling and Medication Reconciliation    Rupesh Muller  [address] [place] [address]  [phone] (home)   78 year old male  PCP: Osakr Casiano    Allergies: No clinical screening - see comments    Discharge Counseling:    Pharmacist met with patient (and/or family) today to review the medication portion of the After Visit Summary (with an emphasis on NEW medications) and to address patient's questions/concerns.    Summary of Education: Provided education on hydrocodone-acetaminophen    Materials Provided:  MedCounselor sheets printed from Clinical Pharmacology on: hydrocodone-acetaminophen    Discharge Medication Reconciliation:    Gwyn Chen RP has reviewed the patient's discharge medication orders and has compared them to the inpatient medication administration record and to what the patient was taking prior to admission - any discrepancies have been resolved.    It has been determined that the patient has an adequate supply of medications available or which can be obtained from the patient's preferred pharmacy.    Thank you for the consult.    Gwyn Chen RPH........November 14, 2018 8:26 AM

## 2018-11-14 NOTE — PROGRESS NOTES
Indwelling islas patent with and draining with no difficulty. Draining adequate amounts of clear, yellow urine. No clots or blood tinged urine present. Educated patient on catheter care and use of leg bag. Patient verbalizes that he will leave standard islas bag attached at all times. Verbalizes understanding of isals bag and cath care.   Manasa Pope RN on 11/14/2018 at 10:12 AM

## 2018-11-14 NOTE — PROGRESS NOTES
NSG DISCHARGE NOTE    Patient discharged to home at 10:40 AM via ambulation with walker. Accompanied by spouse and son and staff. Discharge instructions reviewed with patient, opportunity offered to ask questions. Prescriptions sent with patient to fill . All belongings sent with patient.  Manasa Pope RN on 11/14/2018 at 10:55 AM

## 2018-11-20 ENCOUNTER — ALLIED HEALTH/NURSE VISIT (OUTPATIENT)
Dept: UROLOGY | Facility: OTHER | Age: 78
End: 2018-11-20
Attending: FAMILY MEDICINE
Payer: MEDICARE

## 2018-11-20 VITALS — HEIGHT: 71 IN | WEIGHT: 267 LBS | BODY MASS INDEX: 37.38 KG/M2 | HEART RATE: 60 BPM

## 2018-11-20 DIAGNOSIS — N40.1 BENIGN PROSTATIC HYPERPLASIA WITH URINARY RETENTION: Primary | ICD-10-CM

## 2018-11-20 DIAGNOSIS — R33.8 BENIGN PROSTATIC HYPERPLASIA WITH URINARY RETENTION: Primary | ICD-10-CM

## 2018-11-20 PROCEDURE — 51700 IRRIGATION OF BLADDER: CPT | Performed by: UROLOGY

## 2018-11-20 PROCEDURE — 51798 US URINE CAPACITY MEASURE: CPT

## 2018-11-20 ASSESSMENT — PAIN SCALES - GENERAL: PAINLEVEL: NO PAIN (0)

## 2018-11-20 NOTE — NURSING NOTE
Void Trial  Verbal order read back by Jarad Richey MD to perform void trial and post void residual bladder scan.  Patient's bladder was filled under gravity with 360mL of sterile saline.  Patient voided 100mL.  Post-void residual was measured by ultrasonic bladder scanner: 355 mL  Tammie Lim RN on 11/20/2018 at 9:16 PM

## 2018-11-20 NOTE — MR AVS SNAPSHOT
"              After Visit Summary   11/20/2018    Rupesh Muller    MRN: [id number]           Patient Information     Date Of Birth          1940        Visit Information        Provider Department      11/20/2018 9:15 AM  UROLOGY NURSE [facility]        Today's Diagnoses     Benign prostatic hyperplasia with urinary retention    -  1       Follow-ups after your visit        Your next 10 appointments already scheduled     Nov 29, 2018 11:15 AM CST   Return Visit with Jarad Richey MD   [facility] ([facility]    [address]  [place] [address] [phone]              Who to contact     If you have questions or need follow up information about today's clinic visit or your schedule please contact [facility] directly at [phone].  Normal or non-critical lab and imaging results will be communicated to you by MyChart, letter or phone within 4 business days after the clinic has received the results. If you do not hear from us within 7 days, please contact the clinic through MyChart or phone. If you have a critical or abnormal lab result, we will notify you by phone as soon as possible.  Submit refill requests through [employer] or call your pharmacy and they will forward the refill request to us. Please allow 3 business days for your refill to be completed.          Additional Information About Your Visit        Care EveryWhere ID     This is your Care EveryWhere ID. This could be used by other organizations to access your [place] medical records  [id number]        Your Vitals Were     Pulse Height BMI (Body Mass Index)             60 1.803 m (5' 11\") 37.24 kg/m2          Blood Pressure from Last 3 Encounters:   11/14/18 133/65   09/05/18 110/60    Weight from Last 3 Encounters:   11/20/18 121.1 kg (267 lb)   11/14/18 108.4 kg (238 lb 15.7 oz)   09/05/18 122.5 kg (270 lb)              We Performed " the Following     Bladder scan        Primary Care Provider Office Phone # Fax #    Oskar Casiano [phone]       [facility] [address] [place] [address]        Equal Access to Services     [facility] ZAID : Nneka martin elieo Sosamaraali, waaxda luqadaha, qaybta kaalmada adeegyada, waxnish pearson dderickmarino dhillon luisa melara. So [facility] [phone].    ATENCIÓN: Si habla español, tiene a cardenas disposición servicios gratuitos de asistencia lingüística. [facility] [phone].    We comply with applicable federal civil rights laws and [place] laws. We do not discriminate on the basis of race, color, national origin, age, disability, sex, sexual orientation, or gender identity.            Thank you!     Thank you for choosing [facility] AND [facility]  for your care. Our goal is always to provide you with excellent care. Hearing back from our patients is one way we can continue to improve our services. Please take a few minutes to complete the written survey that you may receive in the mail after your visit with us. Thank you!             Your Updated Medication List - Protect others around you: Learn how to safely use, store and throw away your medicines at www.disposemymeds.org.          This list is accurate as of 11/20/18 12:52 PM.  Always use your most recent med list.                   Brand Name Dispense Instructions for use Diagnosis    ascorbic acid 500 MG tablet    VITAMIN C     Take 500 mg by mouth 2 times daily        finasteride 5 MG tablet    PROSCAR    90 tablet    Take 1 tablet (5 mg) by mouth daily    Benign prostatic hyperplasia with urinary retention       FLOMAX 0.4 MG capsule   Generic drug:  tamsulosin      Take 0.4 mg by mouth daily        folic acid 400 MCG tablet    FOLVITE     Take 400 mcg by mouth        gabapentin 100 MG capsule    NEURONTIN     Take 300 mg by mouth 2 times daily        HYDROcodone-acetaminophen 5-325 MG per tablet    NORCO    15 tablet     Take 1-2 tablets by mouth every 4-6 hours as needed for severe pain. Maximum of 4000 mg acetaminophen per day.    Benign prostatic hyperplasia with urinary retention       losartan 50 MG tablet    COZAAR     Take 100 mg by mouth        metFORMIN 1000 MG tablet    GLUCOPHAGE     Take 1,000 mg by mouth 2 times daily (with meals)        MULTI-VITAMINS Tabs           NONFORMULARY           omeprazole-sodium bicarbonate  MG Caps per capsule      Take 1 capsule by mouth every morning (before breakfast)        RA POTASSIUM GLUCONATE 595 (99 K) MG Tabs   Generic drug:  Potassium Gluconate           rivaroxaban ANTICOAGULANT 20 MG Tabs tablet    XARELTO     Take 20 mg by mouth        simvastatin 10 MG tablet    ZOCOR     Take 10 mg by mouth        TORSEMIDE PO      Take 20 mg by mouth Two tabs in AM and one at 2 pm        UNABLE TO FIND      1 capsule daily MEDICATION NAME:  [employer] Nutrition oral cap.s (dha-Epa-flaxseed oil-Vitamin E

## 2018-11-29 ENCOUNTER — OFFICE VISIT (OUTPATIENT)
Dept: UROLOGY | Facility: OTHER | Age: 78
End: 2018-11-29
Attending: UROLOGY
Payer: MEDICARE

## 2018-11-29 VITALS — SYSTOLIC BLOOD PRESSURE: 124 MMHG | DIASTOLIC BLOOD PRESSURE: 60 MMHG | BODY MASS INDEX: 33.95 KG/M2 | WEIGHT: 243.4 LBS

## 2018-11-29 DIAGNOSIS — R33.9 URINARY RETENTION: Primary | ICD-10-CM

## 2018-11-29 PROCEDURE — 51798 US URINE CAPACITY MEASURE: CPT | Performed by: UROLOGY

## 2018-11-29 PROCEDURE — 99024 POSTOP FOLLOW-UP VISIT: CPT | Performed by: UROLOGY

## 2018-11-29 PROCEDURE — G0463 HOSPITAL OUTPT CLINIC VISIT: HCPCS | Mod: 25

## 2018-11-29 ASSESSMENT — PAIN SCALES - GENERAL: PAINLEVEL: NO PAIN (0)

## 2018-11-29 NOTE — NURSING NOTE
Pt presents to clinic for a one week follow up on urinary retention    Review of Systems:    Weight loss:    No     Recent fever/chills:  No   Night sweats:   No  Current skin rash:  No   Recent hair loss:  No  Heat intolerance:  No   Cold intolerance:  No  Chest pain:   No   Palpitations:   No  Shortness of breath:  No   Wheezing:   No  Constipation:    No   Diarrhea:   No   Nausea:   No   Vomiting:   No   Kidney/side pain:  No   Back pain:   No  Frequent headaches:  No   Dizziness:     No  Leg swelling:   No   Calf pain:    No      Post-Void Residual  A post-void residual was measured by ultrasonic bladder scanner.  308 mL  Pt not able to urinate

## 2018-11-29 NOTE — LETTER
Oskar Casiano MD  [facility]   [address]  [place] [address]      November 29, 2018  MRN: [id number]    Dear Dr. Casiano,          I saw Rupesh Muller today 2 weeks status post TURP.  As you recall he was catheter dependent prior to surgery 2 weeks ago.  He passed his void trial at 1 week.  He follows up today describing improved symptoms of strength of stream and sense of complete emptying.    His PVR today is 300 mL however he was unable to void.  His last void was 3-4 hours ago.  I told him he can follow-up with me as needed in the future if any problems develop.    Thank you for allowing me to take part in this patient s care.  Please feel free to contact me with any questions      Regards,            Jarad Richey MD, PharmD, FACS  Urologist  [address] [place] [address]  Appointments: [phone]

## 2018-11-29 NOTE — MR AVS SNAPSHOT
After Visit Summary   11/29/2018    Rupesh Muller    MRN: [id number]           Patient Information     Date Of Birth          1940        Visit Information        Provider Department      11/29/2018 11:15 AM Jarad Richey MD [facility]        Today's Diagnoses     Urinary retention    -  1       Follow-ups after your visit        Who to contact     If you have questions or need follow up information about today's clinic visit or your schedule please contact [facility] directly at [phone].  Normal or non-critical lab and imaging results will be communicated to you by MyChart, letter or phone within 4 business days after the clinic has received the results. If you do not hear from us within 7 days, please contact the clinic through MyChart or phone. If you have a critical or abnormal lab result, we will notify you by phone as soon as possible.  Submit refill requests through [employer] or call your pharmacy and they will forward the refill request to us. Please allow 3 business days for your refill to be completed.          Additional Information About Your Visit        Care EveryWhere ID     This is your Care EveryWhere ID. This could be used by other organizations to access your [place] medical records  [id number]        Your Vitals Were     BMI (Body Mass Index)                   33.95 kg/m2            Blood Pressure from Last 3 Encounters:   11/29/18 124/60   11/14/18 133/65   09/05/18 110/60    Weight from Last 3 Encounters:   11/29/18 110.4 kg (243 lb 6.4 oz)   11/20/18 121.1 kg (267 lb)   11/14/18 108.4 kg (238 lb 15.7 oz)              We Performed the Following     POST-VOID RESIDUAL BLADDER SCAN        Primary Care Provider Office Phone # Fax #    Oskar Casiano [phone]       [facility] [address] [place] [address]        Equal Access to Services     CHARLES MAR AH: Nneka Moran,  wajoda tootiebrooke, qaybta kayonis reynoso, jeri pabonaarojas ah. So [facility] [phone].    ATENCIÓN: Si pee dsouza, tiene a cardenas disposición servicios gratuitos de asistencia lingüística. Barbara al [phone].    We comply with applicable federal civil rights laws and [place] laws. We do not discriminate on the basis of race, color, national origin, age, disability, sex, sexual orientation, or gender identity.            Thank you!     Thank you for choosing [facility] AND [facility]  for your care. Our goal is always to provide you with excellent care. Hearing back from our patients is one way we can continue to improve our services. Please take a few minutes to complete the written survey that you may receive in the mail after your visit with us. Thank you!             Your Updated Medication List - Protect others around you: Learn how to safely use, store and throw away your medicines at www.disposemymeds.org.          This list is accurate as of 11/29/18 11:40 AM.  Always use your most recent med list.                   Brand Name Dispense Instructions for use Diagnosis    finasteride 5 MG tablet    PROSCAR    90 tablet    Take 1 tablet (5 mg) by mouth daily    Benign prostatic hyperplasia with urinary retention       FLOMAX 0.4 MG capsule   Generic drug:  tamsulosin      Take 0.4 mg by mouth daily        folic acid 400 MCG tablet    FOLVITE     Take 400 mcg by mouth        gabapentin 100 MG capsule    NEURONTIN     Take 300 mg by mouth 2 times daily        HYDROcodone-acetaminophen 5-325 MG tablet    NORCO    15 tablet    Take 1-2 tablets by mouth every 4-6 hours as needed for severe pain. Maximum of 4000 mg acetaminophen per day.    Benign prostatic hyperplasia with urinary retention       losartan 50 MG tablet    COZAAR     Take 100 mg by mouth        metFORMIN 1000 MG tablet    GLUCOPHAGE     Take 1,000 mg by mouth 2 times daily (with meals)        MULTI-VITAMINS Tabs            NONFORMULARY           omeprazole-sodium bicarbonate  MG Caps per capsule      Take 1 capsule by mouth every morning (before breakfast)        RA POTASSIUM GLUCONATE 595 (99 K) MG Tabs   Generic drug:  Potassium Gluconate           rivaroxaban ANTICOAGULANT 20 MG Tabs tablet    XARELTO     Take 20 mg by mouth        simvastatin 10 MG tablet    ZOCOR     Take 10 mg by mouth        TORSEMIDE PO      Take 20 mg by mouth Two tabs in AM and one at 2 pm        UNABLE TO FIND      1 capsule daily MEDICATION NAME:  [employer] Nutrition oral cap.s (dha-Epa-flaxseed oil-Vitamin E        vitamin C 500 MG tablet    ASCORBIC ACID     Take 500 mg by mouth 2 times daily

## 2018-11-29 NOTE — PROGRESS NOTES
Type of Visit  Established post-op    HPI  S/p TURP 2 weeks  He passed the void trial 1 week ago.  Patient reports significant improvement in his sense of emptying and strength of stream.  Urine is clear.      Nursing Notes:   Soheila Lucas LPN  11/29/2018 11:27 AM  Signed  Pt presents to clinic for a one week follow up on urinary retention    Review of Systems:    Weight loss:    No     Recent fever/chills:  No   Night sweats:   No  Current skin rash:  No   Recent hair loss:  No  Heat intolerance:  No   Cold intolerance:  No  Chest pain:   No   Palpitations:   No  Shortness of breath:  No   Wheezing:   No  Constipation:    No   Diarrhea:   No   Nausea:   No   Vomiting:   No   Kidney/side pain:  No   Back pain:   No  Frequent headaches:  No   Dizziness:     No  Leg swelling:   No   Calf pain:    No      Post-Void Residual  A post-void residual was measured by ultrasonic bladder scanner.  308 mL  Pt not able to urinate  *last void was 3 hours ago    Assessment  BPH with obstruction: improved.  He is now catheter free.  Symptoms significantly improved    Plan  Follow up as needed with me if any problems develop

## 2019-04-30 ENCOUNTER — ALLIED HEALTH/NURSE VISIT (OUTPATIENT)
Dept: EDUCATION SERVICES | Facility: OTHER | Age: 79
End: 2019-04-30
Attending: FAMILY MEDICINE
Payer: MEDICARE

## 2019-04-30 DIAGNOSIS — E11.69 DIABETES MELLITUS TYPE 2 IN OBESE: Primary | ICD-10-CM

## 2019-04-30 DIAGNOSIS — E66.9 DIABETES MELLITUS TYPE 2 IN OBESE: Primary | ICD-10-CM

## 2019-04-30 PROCEDURE — G0108 DIAB MANAGE TRN  PER INDIV: HCPCS

## 2019-05-06 RX ORDER — TAMSULOSIN HYDROCHLORIDE 0.4 MG/1
0.4 CAPSULE ORAL
COMMUNITY
Start: 2016-06-21

## 2019-05-06 RX ORDER — HYDROCORTISONE 5 MG/1
15 TABLET ORAL EVERY MORNING
COMMUNITY
Start: 2019-04-22

## 2019-05-06 RX ORDER — VITAMIN A ACETATE, BETA CAROTENE, ASCORBIC ACID, CHOLECALCIFEROL, .ALPHA.-TOCOPHEROL ACETATE, DL-, THIAMINE MONONITRATE, RIBOFLAVIN, NIACINAMIDE, PYRIDOXINE HYDROCHLORIDE, FOLIC ACID, CYANOCOBALAMIN, CALCIUM CARBONATE, FERROUS FUMARATE, ZINC OXIDE, CUPRIC OXIDE 3080; 12; 120; 400; 1; 1.84; 3; 20; 22; 920; 25; 200; 27; 10; 2 [IU]/1; UG/1; MG/1; [IU]/1; MG/1; MG/1; MG/1; MG/1; MG/1; [IU]/1; MG/1; MG/1; MG/1; MG/1; MG/1
1 TABLET, FILM COATED ORAL DAILY
COMMUNITY
Start: 2019-04-22 | End: 2021-10-15

## 2019-05-06 RX ORDER — LANCETS
EACH MISCELLANEOUS
COMMUNITY
Start: 2019-04-22

## 2019-05-06 RX ORDER — SPIRONOLACTONE 25 MG/1
25 TABLET ORAL
COMMUNITY
Start: 2019-04-22

## 2019-05-06 RX ORDER — PREDNISONE 20 MG/1
40 TABLET ORAL
COMMUNITY
Start: 2019-04-22

## 2019-05-06 RX ORDER — SIMVASTATIN 10 MG
10 TABLET ORAL
COMMUNITY
Start: 2014-10-27 | End: 2021-10-15

## 2019-05-06 RX ORDER — GABAPENTIN 300 MG/1
300 TABLET, FILM COATED ORAL
COMMUNITY

## 2019-05-06 RX ORDER — COLCHICINE 0.6 MG/1
0.6 TABLET ORAL 2 TIMES DAILY
COMMUNITY
Start: 2019-04-22 | End: 2021-10-15

## 2019-05-06 RX ORDER — INSULIN GLARGINE 100 [IU]/ML
20 INJECTION, SOLUTION SUBCUTANEOUS
COMMUNITY
Start: 2019-04-22

## 2019-05-06 RX ORDER — POTASSIUM CHLORIDE 1500 MG/1
10 TABLET, EXTENDED RELEASE ORAL
COMMUNITY
Start: 2019-04-22

## 2019-05-06 RX ORDER — FUROSEMIDE 20 MG
60 TABLET ORAL DAILY
COMMUNITY
Start: 2017-11-27 | End: 2021-10-15

## 2019-05-06 RX ORDER — FINASTERIDE 5 MG/1
5 TABLET, FILM COATED ORAL
COMMUNITY
End: 2021-10-15

## 2019-05-06 RX ORDER — BLOOD SUGAR DIAGNOSTIC
4 STRIP MISCELLANEOUS DAILY
COMMUNITY
Start: 2019-04-22

## 2019-05-06 RX ORDER — HYDROCORTISONE 5 MG/1
20 TABLET ORAL
COMMUNITY
Start: 2019-04-22

## 2019-05-06 NOTE — PROGRESS NOTES
"Diabetes Self-Management Education & Support    Diabetes Education Self Management & Training    SUBJECTIVE/OBJECTIVE:  Presents for: Individual review  Accompanied by: Son  Focus of Visit: Other(Insulin education)  Diabetes type: Type 2  Disease course: Improving  How confident are you filling out medical forms by yourself:: Quite a bit  Cultural Influences/Ethnic Background:  American  Patient states he would like more information about diabetes and insulin.  He shares his diabetes history before recent heart issues and is hoping he can get off the insulin.       Diabetes Symptoms & Complications     Heart disease: Yes    Patient Problem List and Family Medical History reviewed for relevant medical history, current medical status, and diabetes risk factors.    Vitals:  There were no vitals taken for this visit.  Estimated body mass index is 33.95 kg/m  as calculated from the following:    Height as of 11/20/18: 1.803 m (5' 11\").    Weight as of 11/29/18: 110.4 kg (243 lb 6.4 oz).   Last 3 BP:   BP Readings from Last 3 Encounters:   11/29/18 124/60   11/14/18 133/65   09/05/18 110/60       History   Smoking Status     Former Smoker     Types: Cigarettes     Quit date: 1/1/1992   Smokeless Tobacco     Never Used       Labs:  No results found for: A1C  No results found for: GLC  No results found for: LDL  No results found for: HDL]  No results found for: GFRESTIMATED  No results found for: GFRESTBLACK  No results found for: CR  No results found for: MICROALBUMIN    Healthy Eating  Healthy Eating Assessed Today: Yes  Cultural/Druze diet restrictions?: No  Patient on a regular basis: Eats 3 meals a day  Meal planning: Heart healthy, Low salt  Meals include: Lunch, Dinner, Breakfast    Being Active  Being Active Assessed Today: Yes  Barrier to exercise: Physical limitation    Monitoring  Monitoring Assessed Today: Yes  Did patient bring glucose meter to appointment? : (BG logbook)  Home Glucose (Sugar) Monitoring: " 3-4 times per day  Blood glucose trend: Fluctuating dramtically  Low Glucose Range (mg/dL):   High Glucose Range (mg/dL): >200  Overall Range (mg/dL): >200(5d BG range 99 - 472 mg/dL.)    Average 5d  (range 99 - 137), preLunch  (range 251 - 414), preSupper  (range 245 - 288), and bedtime  (range 164 - 244) mg/dL.  Patient denies hypoglycemia.    Taking Medications  Diabetes Medication(s)     Biguanides       metFORMIN (GLUCOPHAGE) 1000 MG tablet    Take 1,000 mg by mouth 2 times daily (with meals)     Insulin       insulin aspart (NOVOLOG FLEXPEN) 100 UNIT/ML pen    Inject before meals and at bedtime per high correction scale.     insulin glargine (BASAGLAR KWIKPEN) 100 UNIT/ML pen    Inject 15 Units Subcutaneous          Taking Medication Assessed Today: Yes  Current Treatments: Insulin Injections, Diet  Dose schedule: pre-breakfast, pre-lunch, pre-dinner  Given by: Patient  Injection/Infusion sites: Abdomen  Problems taking diabetes medications regularly?: No  Diabetes medication side effects?: No  Treatment Compliance: All of the time    Problem Solving  Problem Solving Assessed Today: Yes  Hypoglycemia Frequency: Rarely  Hypoglycemia Treatment: Juice      Reducing Risks  Diabetes Risks: Age over 45 years, Hyperlipidemia, Family History  CAD Risks: Diabetes Mellitus, Family history, Hypertension, Male sex, Obesity, Sedentary lifestyle    Healthy Coping  Healthy Coping Assessed Today: Yes  Emotional response to diabetes: Ready to learn, Acceptance  Informal Support system:: Family  Stage of change: ACTION (Actively working towards change)  Support resources: Offerings in Clinic Communities  Patient Activation Measure Survey Score:  No flowsheet data found.    ASSESSMENT:  Patient visits with his son.  He brings in his medication list for update.  Reviewed insulin training given and discussed current use of insulin.  Patient states he would like to get off the insulin.  Discussed  benefit of insulin therapy for hyperglycemia.      Patient insulin plan followed by PCP.  No new changes made today.  Encouraged patient to follow up with PCP for insulin adjustments, as needed.  Novolog Sliding Scale scanned into visit.    Reviewed CHO counting and discussed benefit of low intensity physical activity, such as walking slowly, patient to follow PCP guidelines for physical activity.       Patient's goal of <8.0    INTERVENTION:   Diabetes knowledge and skills assessment:     Patient is knowledgeable in diabetes management concepts related to: Monitoring    Patient needs further education on the following diabetes management concepts: Healthy Eating, Being Active, Taking Medication, Problem Solving and Reducing Risks    Based on learning assessment above, most appropriate setting for further diabetes education would be: Group class or Individual setting.    Education provided today on:  AADE Self-Care Behaviors:  Diabetes Pathophysiology  Healthy Eating: plate planning method  Taking Medication: action of prescribed medication, drawing up, administering and storing injectable diabetes medications, proper site selection and rotation for injections, side effects of prescribed medications and when to take medications  Reducing Risks: major complications of diabetes, prevention, early diagnostic measures and treatment of complications, A1C - goals, relating to blood glucose levels, how often to check, lipids levels and goals and blood pressure and goals  Insulin administration technique taught today. Patient verbalized understanding and was able to perform an accurate return demonstration of administration technique.    Opportunities for ongoing education and support in diabetes-self management were discussed.    Pt verbalized understanding of concepts discussed and recommendations provided today.       Education Materials Provided:  My Insulin Plan, Eating For Better Health, Activity Pyramid, Diabetes  Success Plan    PLAN:  See Patient Instructions for co-developed, patient-stated behavior change goals.  AVS printed and provided to patient today. See Follow-Up section for recommended follow-up.    Ca Reyes RN, BSN, CDE  4/30/2019 4:29 PM   Time Spent: 60 minutes  Encounter Type: Individual    Any diabetes medication dose changes were made via the CDE Protocol and Collaborative Practice Agreement with the patient's primary care provider. A copy of this encounter was shared with the provider.

## 2019-05-06 NOTE — PATIENT INSTRUCTIONS
Diabetes Goals and Reminders    Your A1c test should be done every 3 months.  Your goal is less than 8%.   Your last result is:  No results found for: A1C    Your LDL cholesterol test should be done at least once a year.  Take a statin, if prescribed by your doctor, based on age and risk factors.  Your last result is:  No results found for: LDL    Have blood pressure and weight checked every three months.  Your blood pressure goal is 140/90 or less.  Your last blood pressure reading was:   BP Readings from Last 1 Encounters:   11/29/18 124/60       Test your blood sugar 4 times per day.  Your home blood glucose target ranges are:  Fasting or Before meals: 100-160  2 hours after a meal: Less than 200  Bedtime 130 - 190 mg/dL       Avoid all tobacco.  Follow your healthy diet and exercise plan.  See the eye doctor every year.  See the dentist every six months.  Have kidney function tested yearly.    Take all medicine as prescribed.  Please let me know if you are having symptoms you don t expect or if you wish to stop any medicine.    Follow Up Plan  Please call or visit Diabetes Education if blood sugars are consistently out of target.      If you need your cholesterol checked at your next appointment, you should fast 8 to 10 hours before your appointment.  Do not eat or drink anything besides water.  Drink plenty of water and take all your usual medicine.    SUMMARY FOR TODAY'S VISIT    1.  Discussed insulin action and current insulin plan.  Continue current insulin doses per Dr. Casiano.    2.  Discussed carbohydrate counting, recommend 3 - 4 choices per meal, 0 - 1 choice per snack (limiting snacks to help with weight loss and tighter BG control).    3.  Recommend low intensity physical activity, such as slow walking, per PCP guidelines.     4.  Follow up for continued diabetes education, as needed.      Ca Reyes RN, BSN, CDE  4/30/2019 3:44 PM

## 2021-09-04 ENCOUNTER — TRANSFERRED RECORDS (OUTPATIENT)
Dept: HEALTH INFORMATION MANAGEMENT | Facility: OTHER | Age: 81
End: 2021-09-04

## 2021-09-30 ENCOUNTER — MEDICAL CORRESPONDENCE (OUTPATIENT)
Dept: HEALTH INFORMATION MANAGEMENT | Facility: OTHER | Age: 81
End: 2021-09-30

## 2021-10-06 ENCOUNTER — TELEPHONE (OUTPATIENT)
Dept: UROLOGY | Facility: OTHER | Age: 81
End: 2021-10-06

## 2021-10-06 NOTE — TELEPHONE ENCOUNTER
I spoke to Elsi and informed her to keep the catheter in for the void trial.  Jessenia Floyd LPN on 10/6/2021 at 10:24 AM

## 2021-10-06 NOTE — TELEPHONE ENCOUNTER
Elsi form [facility] called and they have questions about a void trial. Wondering if they should pull the catheter in the morning prior to the patients appt. Please contact her at [phone] and ask for Elsi.    Jennifer Pope on 10/6/2021 at 9:46 AM

## 2021-10-07 ENCOUNTER — OFFICE VISIT (OUTPATIENT)
Dept: UROLOGY | Facility: OTHER | Age: 81
End: 2021-10-07
Attending: UROLOGY
Payer: MEDICARE

## 2021-10-07 VITALS
HEART RATE: 72 BPM | SYSTOLIC BLOOD PRESSURE: 124 MMHG | RESPIRATION RATE: 18 BRPM | DIASTOLIC BLOOD PRESSURE: 62 MMHG | OXYGEN SATURATION: 97 %

## 2021-10-07 DIAGNOSIS — R33.8 URINARY RETENTION DUE TO BENIGN PROSTATIC HYPERPLASIA: Primary | ICD-10-CM

## 2021-10-07 DIAGNOSIS — D49.4 BLADDER TUMOR: ICD-10-CM

## 2021-10-07 DIAGNOSIS — N40.1 URINARY RETENTION DUE TO BENIGN PROSTATIC HYPERPLASIA: Primary | ICD-10-CM

## 2021-10-07 PROCEDURE — 51798 US URINE CAPACITY MEASURE: CPT | Performed by: UROLOGY

## 2021-10-07 PROCEDURE — G0463 HOSPITAL OUTPT CLINIC VISIT: HCPCS | Mod: 25

## 2021-10-07 PROCEDURE — 52000 CYSTOURETHROSCOPY: CPT | Performed by: UROLOGY

## 2021-10-07 PROCEDURE — 51700 IRRIGATION OF BLADDER: CPT | Performed by: UROLOGY

## 2021-10-07 PROCEDURE — 51702 INSERT TEMP BLADDER CATH: CPT | Performed by: UROLOGY

## 2021-10-07 PROCEDURE — 99214 OFFICE O/P EST MOD 30 MIN: CPT | Mod: 25 | Performed by: UROLOGY

## 2021-10-07 RX ORDER — CEFTRIAXONE 1 G/1
1 INJECTION, POWDER, FOR SOLUTION INTRAMUSCULAR; INTRAVENOUS
Status: CANCELLED | OUTPATIENT
Start: 2021-10-07

## 2021-10-07 ASSESSMENT — PAIN SCALES - GENERAL: PAINLEVEL: NO PAIN (0)

## 2021-10-07 NOTE — NURSING NOTE
Pt presents to clinic for a void trial    Review of Systems:    Weight loss:    No     Recent fever/chills:  No   Night sweats:   No  Current skin rash:  No   Recent hair loss:  No  Heat intolerance:  No   Cold intolerance:  No  Chest pain:   No   Palpitations:   No  Shortness of breath:  No   Wheezing:   No  Constipation:    No   Diarrhea:   No   Nausea:   No   Vomiting:   No   Kidney/side pain:  No   Back pain:   Yes  Frequent headaches:  No   Dizziness:     No  Leg swelling:   No   Calf pain:    No

## 2021-10-07 NOTE — PATIENT INSTRUCTIONS

## 2021-10-07 NOTE — NURSING NOTE
Male Catheter   Patient came to the clinic for void trail Uro jet used for local anesthesia. Patient prepped and draped in sterile manner. New 18F coude tip islas inserted with no problems, urine return noted. Will return to the clinic for monthly catheter change. Jarad Richey MD   supervised the procedure.  Urojet 2%  Lot#: [id number]  Expiration date: 4/2023  : [employer]  NDC: [address]-6

## 2021-10-07 NOTE — PROGRESS NOTES
Type of Visit  NPV    Chief Complaint  Urinary retention    HPI  Mr. Muller is a 81 year old male with a history of TURP in 2018 who follows up with recurrent urinary retention.  The patient was catheter dependent prior to TURP in 2018.  Following the uncomplicated TURP he became catheter free and has been voiding well since that time.  In the past few weeks he has developed pelvic pain and inability to void.  Catheter has been placed and he has failed multiple void trials closer to home in [place].  He follows up with a catheter to discuss urinary retention.    He denies any other associated symptoms such as gross hematuria, pelvic pain, unintentional weight loss.  He has significant back pain limiting his ability to ambulate.      Past Medical History  He  has a past medical history of Atrial fibrillation (H) with ventricular ectopic beats, Basal cell carcinoma nodular type (09/27/2012), Bigeminy, BPH (benign prostatic hyperplasia), Chronic atrial fibrillation with prolonged pauses, chronic recurrent ingrown toe nails, Congestive heart failure (H), Diabetes mellitus type 2 in obese (H), Heart failure with preserved ejection fraction (H), Hyperlipidemia, Hypertension, Hyponatremia, Interstitial lung disease (H), Morbid obesity (H), Pericardial effusion, Peripheral neuropathy (08/2006), Pulmonary fibrosis (H), Sick sinus syndrome (H), and Trigeminy epidsodes.  Patient Active Problem List   Diagnosis     Diabetes mellitus type 2 in obese (H)     Hyperlipidemia     Hypertension     chronic recurrent ingrown toe nails     Basal cell carcinoma nodular type     Atrial fibrillation (H) with ventricular ectopic beats     Bigeminy     Trigeminy epidsodes     Chronic atrial fibrillation with prolonged pauses     Heart failure with preserved ejection fraction (H)     Benign prostatic hyperplasia with urinary retention     BPH (benign prostatic hyperplasia)     Hyponatremia     Urinary retention       Past Surgical  History  He  has a past surgical history that includes hernia repair (1985); descended right testicle (1985); pace maker; cardiac catherization (Right, 10/16/2018); paricardiocentesis (10/16/2018); exc skin malig 0.5cm or less,facial (09/27/2012); pacemaker placement (08/26/2018); removal of nail bed (Bilateral); and Cystoscopy, transurethral resection (TUR) prostate, combined (N/A, 11/13/2018).    Medications  He has a current medication list which includes the following prescription(s): vitamin c, blood glucose, blood glucose monitoring, cholecalciferol, colchicine, finasteride, finasteride, flomax, folic acid, furosemide, gabapentin, gabapentin, hydrocodone-acetaminophen, hydrocortisone, hydrocortisone, insulin aspart, insulin glargine, insulin pen needle, losartan, magnesium oxide, metformin, multi-vitamins, NONFORMULARY, omeprazole, omeprazole-sodium bicarbonate, potassium chloride er, potassium gluconate, prednisone, prenatal plus, rivaroxaban anticoagulant, rivaroxaban anticoagulant, simvastatin, simvastatin, spironolactone, tamsulosin, torsemide, and UNABLE TO FIND.    Allergies  Allergies   Allergen Reactions     No Clinical Screening - See Comments Itching     CT scan in [place] about 30 years ago -   Not likely an allergy per patient (11/13/18)       Social History  He  reports that he quit smoking about 29 years ago. His smoking use included cigarettes. He has never used smokeless tobacco. He reports that he does not drink alcohol.  No drug abuse.    Family History  No family history on file.    Review of Systems  I personally reviewed the ROS with the patient.    Nursing Notes:   Soheila Lucas LPN  10/7/2021  8:59 AM  Addendum  Pt presents to clinic for a void trial    Review of Systems:    Weight loss:    No     Recent fever/chills:  No   Night sweats:   No  Current skin rash:  No   Recent hair loss:  No  Heat intolerance:  No   Cold intolerance:  No  Chest  pain:   No   Palpitations:   No  Shortness of breath:  No   Wheezing:   No  Constipation:    No   Diarrhea:   No   Nausea:   No   Vomiting:   No   Kidney/side pain:  No   Back pain:   Yes  Frequent headaches:  No   Dizziness:     No  Leg swelling:   No   Calf pain:    No          Soheila Lucas LPN  10/7/2021  9:52 AM  Signed  Patient positioned in supine position, perineum area prepped with chlorhexidene Gluconate and patient draped per sterile technique. Per verbal order read back by Jarad Richey MD, Urojet 10mL 2% lidocaine jelly to be instilled into urethra.  Urojet- 10ml 2% Lidocaine jelly instilled into the urethra.    Urojet 2%  Lot#: [id number]  Expiration date: 4/2023  : [employer]  NDC: [address]-6    [facility] Protocol    A. Pre-procedure verification complete Yes  1-relevant information / documentation available, reviewed and properly matched to the patient; 2-consent accurate and complete, 3-equipment and supplies available    B. Site marking complete N/A  Site marked if not in continuous attendance with patient    C. TIME OUT completed Yes  Time Out was conducted just prior to starting procedure to verify the eight required elements: 1-patient identity, 2-consent accurate and complete, 3-position, 4-correct side/site marked (if applicable), 5-procedure, 6-relevant images / results properly labeled and displayed (if applicable), 7-antibiotics / irrigation fluids (if applicable), 8-safety precautions.    After procedure perineum area rinsed. Discharge instructions reviewed with patient. Patient verbalized understanding of discharge instructions and discharged ambulatory.  Soheila Lucas LPN..................10/7/2021  9:50 AM      Soheila Lucas LPN  10/7/2021  9:52 AM  Signed  Male Catheter   Patient came to the clinic for void trail Uro jet used for local anesthesia. Patient prepped and draped in sterile manner. New 18F coude tip islas inserted with no problems, urine return  noted. Will return to the clinic for monthly catheter change. Jarad Richey MD   supervised the procedure.  Urojet 2%  Lot#: [id number]  Expiration date: 4/2023  : [employer]  NDC: [address]-9        Physical Exam  Vitals:    10/07/21 0859   BP: 124/62   BP Location: Right arm   Patient Position: Sitting   Cuff Size: Adult Regular   Pulse: 72   Resp: 18   SpO2: 97%     Constitutional: No acute distress.  Alert and cooperative   Head: NCAT  Eyes: Conjunctivae normal  Cardiovascular: Regular rate.  Pulmonary/Chest: Respirations are even and non-labored bilaterally, no audible wheezing  Abdominal: Soft. No distension, tenderness, masses or guarding.   Neurological: A + O x 3.  Cranial Nerves II-XII grossly intact.  Extremities: ALINE x 4, Warm. No clubbing.  No cyanosis.    Skin: Pink, warm and dry.  No visible rashes noted.  Psychiatric:  Normal mood and affect  Back:  No left CVA tenderness.  No right CVA tenderness.  Genitourinary:  Nonpalpable bladder    ^^^^^^^^^^^^^^^^^^^^^^^^^^^^^^^^^^^^^^^^^^^^^^^^^^^^^^^^^^^^^^^^^^^^^^^^^^^^^^^^  Preprocedure diagnosis  Urinary retention    Postprocedure diagnosis  Urinary retention  Bladder tumor    Procedure  Flexible Cystourethroscopy    Surgeon  Jarad Richey MD    Anesthesia  2% lidocaine jelly intraurethrally    Complications  None    Indications  81 year old male undergoing a flexible cystoscopy for the above mentioned indications.    Findings  Cystoscopic findings included a normal anterior urethra.    There was a prostatic defect consistent with a TURP that was patent.  There was a large bladder tumor located at the bladder neck that was causing obstruction.  The bladder appeared to be normal capacity.    There were no stones or foreign bodies.    The orifices were slit-shaped and in their normal location.    Procedure  The patient was placed in supine position and prepped and draped in sterile fashion with lidocaine jelly per urethra for anesthesia.    I passed  "a lubricated 14F flexible cystoscope through the penile urethra and into the bladder and the bladder was completely visualized.  The cystoscope was retroflexed and the bladder neck and prostate visualized.    The cystoscope was slowly withdrawn while visualizing the urethra and the procedure terminated.    The patient tolerated the procedure well.      ^^^^^^^^^^^^^^^^^^^^^^^^^^^^^^^^^^^^^^^^^^^^^^^^^^^^^^^^^^^^^^^^^^^^^^^^^^^^^^^^    Labs  [facility] labs were reviewed    Assessment  Mr. Muller is a 81 year old male with a history of TURP in 2018 who follows up with recurrent urinary retention.  He underwent cystoscopy today for diagnostic purposes and cystoscopy revealed a large bladder tumor located at the bladder neck.  I explained that this likely is causing obstruction which is the source of his retention.  I recommended transurethral resection of bladder tumor for 2 purposes: Treating the bladder cancer as well as optimizing his ability to empty    I recommended formal resection of the bladder tumor identified on cystoscopy.  I explained that this is a procedure performed in the operating room with anesthesia.  I explained the risks, benefits and alternatives to the procedure.  Specifically I discussed the potential for bladder perforation which could lead to conversion to open abdominal surgery to repair the bladder.  I also discussed that when tumors are close to the ureteral orifice there is a chance I may need to leave a stent during the period of healing.    All patient's questions were answered and the patient was consented for the procedure listed below.     Plan  The patient was consented for \"transurethral resection of bladder tumor, bilateral retrograde pyelograms, possible stent/perioperative gemcitabine\"   28 F resectoscope   Muscle paralysis is not requested during anesthesia due to the size/location of tumor.   Preop H&P per PCP for perioperative medication management is appreciated  "

## 2021-10-07 NOTE — NURSING NOTE
Patient positioned in supine position, perineum area prepped with chlorhexidene Gluconate and patient draped per sterile technique. Per verbal order read back by Jarad Richey MD, Urojet 10mL 2% lidocaine jelly to be instilled into urethra.  Urojet- 10ml 2% Lidocaine jelly instilled into the urethra.    Urojet 2%  Lot#: [id number]  Expiration date: 4/2023  : [employer]  NDC: [address]-2    [facility] Protocol    A. Pre-procedure verification complete Yes  1-relevant information / documentation available, reviewed and properly matched to the patient; 2-consent accurate and complete, 3-equipment and supplies available    B. Site marking complete N/A  Site marked if not in continuous attendance with patient    C. TIME OUT completed Yes  Time Out was conducted just prior to starting procedure to verify the eight required elements: 1-patient identity, 2-consent accurate and complete, 3-position, 4-correct side/site marked (if applicable), 5-procedure, 6-relevant images / results properly labeled and displayed (if applicable), 7-antibiotics / irrigation fluids (if applicable), 8-safety precautions.    After procedure perineum area rinsed. Discharge instructions reviewed with patient. Patient verbalized understanding of discharge instructions and discharged ambulatory.  Soheila Lucas LPN..................10/7/2021  9:50 AM

## 2021-10-08 ENCOUNTER — TRANSFERRED RECORDS (OUTPATIENT)
Dept: HEALTH INFORMATION MANAGEMENT | Facility: OTHER | Age: 81
End: 2021-10-08

## 2021-10-08 LAB
CREATININE (EXTERNAL): 0.9 MG/DL (ref 0.7–1.4)
GFR ESTIMATED (EXTERNAL): 86 ML/MIN/1.73M2
GLUCOSE (EXTERNAL): 306 MG/DL (ref 64–112)
HBA1C MFR BLD: 7.2 % (ref 4.4–6.4)
POTASSIUM (EXTERNAL): 4 MEQ/L (ref 3.5–5.3)

## 2021-10-15 ENCOUNTER — TRANSFERRED RECORDS (OUTPATIENT)
Dept: HEALTH INFORMATION MANAGEMENT | Facility: OTHER | Age: 81
End: 2021-10-15

## 2021-10-15 RX ORDER — DOCUSATE SODIUM 100 MG/1
100 CAPSULE, LIQUID FILLED ORAL DAILY
COMMUNITY

## 2021-10-15 RX ORDER — AMLODIPINE BESYLATE 5 MG/1
5 TABLET ORAL DAILY
COMMUNITY

## 2021-10-15 RX ORDER — CALCITONIN SALMON 200 [IU]/.09ML
1 SPRAY, METERED NASAL DAILY
COMMUNITY

## 2021-10-15 RX ORDER — LIDOCAINE 50 MG/G
1 PATCH TOPICAL EVERY 24 HOURS
COMMUNITY

## 2021-10-15 RX ORDER — OXYCODONE HYDROCHLORIDE 5 MG/1
5 CAPSULE ORAL EVERY 4 HOURS PRN
COMMUNITY

## 2021-10-15 RX ORDER — BISACODYL 5 MG/1
5 TABLET, DELAYED RELEASE ORAL DAILY PRN
COMMUNITY

## 2021-10-15 RX ORDER — POTASSIUM CHLORIDE 1500 MG/1
40 TABLET, EXTENDED RELEASE ORAL
COMMUNITY

## 2021-10-15 RX ORDER — CALCIUM CARBONATE 500(1250)
1 TABLET ORAL 2 TIMES DAILY
COMMUNITY

## 2021-10-15 RX ORDER — BISACODYL 10 MG
10 SUPPOSITORY, RECTAL RECTAL DAILY PRN
COMMUNITY

## 2021-10-18 ENCOUNTER — ANESTHESIA EVENT (OUTPATIENT)
Dept: SURGERY | Facility: OTHER | Age: 81
End: 2021-10-18
Payer: MEDICARE

## 2021-10-19 ENCOUNTER — HOSPITAL ENCOUNTER (OUTPATIENT)
Dept: GENERAL RADIOLOGY | Facility: OTHER | Age: 81
End: 2021-10-19
Attending: UROLOGY | Admitting: UROLOGY
Payer: MEDICARE

## 2021-10-19 ENCOUNTER — ANESTHESIA (OUTPATIENT)
Dept: SURGERY | Facility: OTHER | Age: 81
End: 2021-10-19
Payer: MEDICARE

## 2021-10-19 ENCOUNTER — HOSPITAL ENCOUNTER (OUTPATIENT)
Facility: OTHER | Age: 81
Discharge: HOME | End: 2021-10-19
Attending: UROLOGY | Admitting: UROLOGY
Payer: MEDICARE

## 2021-10-19 VITALS
HEART RATE: 59 BPM | BODY MASS INDEX: 31.22 KG/M2 | DIASTOLIC BLOOD PRESSURE: 61 MMHG | TEMPERATURE: 97.4 F | OXYGEN SATURATION: 98 % | SYSTOLIC BLOOD PRESSURE: 145 MMHG | WEIGHT: 223 LBS | HEIGHT: 71 IN | RESPIRATION RATE: 16 BRPM

## 2021-10-19 DIAGNOSIS — D49.4 BLADDER TUMOR: ICD-10-CM

## 2021-10-19 PROCEDURE — 250N000025 HC SEVOFLURANE, PER MIN: Performed by: UROLOGY

## 2021-10-19 PROCEDURE — 999N000141 HC STATISTIC PRE-PROCEDURE NURSING ASSESSMENT: Performed by: UROLOGY

## 2021-10-19 PROCEDURE — 99100 ANES PT EXTEME AGE<1 YR&>70: CPT | Performed by: NURSE ANESTHETIST, CERTIFIED REGISTERED

## 2021-10-19 PROCEDURE — 74420 UROGRAPHY RTRGR +-KUB: CPT | Mod: 26 | Performed by: UROLOGY

## 2021-10-19 PROCEDURE — 272N000001 HC OR GENERAL SUPPLY STERILE: Performed by: UROLOGY

## 2021-10-19 PROCEDURE — 52240 CYSTOSCOPY AND TREATMENT: CPT | Performed by: NURSE ANESTHETIST, CERTIFIED REGISTERED

## 2021-10-19 PROCEDURE — C1769 GUIDE WIRE: HCPCS | Performed by: UROLOGY

## 2021-10-19 PROCEDURE — 258N000003 HC RX IP 258 OP 636: Performed by: NURSE ANESTHETIST, CERTIFIED REGISTERED

## 2021-10-19 PROCEDURE — 710N000010 HC RECOVERY PHASE 1, LEVEL 2, PER MIN: Performed by: UROLOGY

## 2021-10-19 PROCEDURE — 258N000003 HC RX IP 258 OP 636: Performed by: UROLOGY

## 2021-10-19 PROCEDURE — 52240 CYSTOSCOPY AND TREATMENT: CPT | Performed by: UROLOGY

## 2021-10-19 PROCEDURE — 360N000076 HC SURGERY LEVEL 3, PER MIN: Performed by: UROLOGY

## 2021-10-19 PROCEDURE — 999N000180 XR SURGERY CARM FLUORO LESS THAN 5 MIN

## 2021-10-19 PROCEDURE — 710N000012 HC RECOVERY PHASE 2, PER MINUTE: Performed by: UROLOGY

## 2021-10-19 PROCEDURE — 250N000009 HC RX 250: Performed by: NURSE ANESTHETIST, CERTIFIED REGISTERED

## 2021-10-19 PROCEDURE — 250N000009 HC RX 250: Performed by: UROLOGY

## 2021-10-19 PROCEDURE — 250N000011 HC RX IP 250 OP 636: Performed by: UROLOGY

## 2021-10-19 PROCEDURE — 250N000011 HC RX IP 250 OP 636: Performed by: NURSE ANESTHETIST, CERTIFIED REGISTERED

## 2021-10-19 PROCEDURE — 88307 TISSUE EXAM BY PATHOLOGIST: CPT

## 2021-10-19 PROCEDURE — 370N000017 HC ANESTHESIA TECHNICAL FEE, PER MIN: Performed by: UROLOGY

## 2021-10-19 RX ORDER — ACETAMINOPHEN 500 MG
1000 TABLET ORAL
COMMUNITY

## 2021-10-19 RX ORDER — SODIUM CHLORIDE 9 MG/ML
INJECTION, SOLUTION INTRAVENOUS CONTINUOUS
Status: DISCONTINUED | OUTPATIENT
Start: 2021-10-19 | End: 2021-10-19 | Stop reason: HOSPADM

## 2021-10-19 RX ORDER — ONDANSETRON 2 MG/ML
4 INJECTION INTRAMUSCULAR; INTRAVENOUS EVERY 30 MIN PRN
Status: DISCONTINUED | OUTPATIENT
Start: 2021-10-19 | End: 2021-10-19 | Stop reason: HOSPADM

## 2021-10-19 RX ORDER — NALOXONE HYDROCHLORIDE 0.4 MG/ML
0.4 INJECTION, SOLUTION INTRAMUSCULAR; INTRAVENOUS; SUBCUTANEOUS
Status: DISCONTINUED | OUTPATIENT
Start: 2021-10-19 | End: 2021-10-19 | Stop reason: HOSPADM

## 2021-10-19 RX ORDER — PROPOFOL 10 MG/ML
INJECTION, EMULSION INTRAVENOUS PRN
Status: DISCONTINUED | OUTPATIENT
Start: 2021-10-19 | End: 2021-10-19

## 2021-10-19 RX ORDER — OXYCODONE HYDROCHLORIDE 5 MG/1
5 TABLET ORAL EVERY 4 HOURS PRN
Status: DISCONTINUED | OUTPATIENT
Start: 2021-10-19 | End: 2021-10-19 | Stop reason: HOSPADM

## 2021-10-19 RX ORDER — ONDANSETRON 2 MG/ML
INJECTION INTRAMUSCULAR; INTRAVENOUS PRN
Status: DISCONTINUED | OUTPATIENT
Start: 2021-10-19 | End: 2021-10-19

## 2021-10-19 RX ORDER — LIDOCAINE HYDROCHLORIDE 20 MG/ML
INJECTION, SOLUTION INFILTRATION; PERINEURAL PRN
Status: DISCONTINUED | OUTPATIENT
Start: 2021-10-19 | End: 2021-10-19

## 2021-10-19 RX ORDER — CEFTRIAXONE SODIUM 1 G/50ML
1 INJECTION, SOLUTION INTRAVENOUS
Status: COMPLETED | OUTPATIENT
Start: 2021-10-19 | End: 2021-10-19

## 2021-10-19 RX ORDER — FENTANYL CITRATE 50 UG/ML
INJECTION, SOLUTION INTRAMUSCULAR; INTRAVENOUS PRN
Status: DISCONTINUED | OUTPATIENT
Start: 2021-10-19 | End: 2021-10-19

## 2021-10-19 RX ORDER — ONDANSETRON 4 MG/1
4 TABLET, ORALLY DISINTEGRATING ORAL EVERY 30 MIN PRN
Status: DISCONTINUED | OUTPATIENT
Start: 2021-10-19 | End: 2021-10-19 | Stop reason: HOSPADM

## 2021-10-19 RX ORDER — LIDOCAINE 40 MG/G
CREAM TOPICAL
Status: DISCONTINUED | OUTPATIENT
Start: 2021-10-19 | End: 2021-10-19 | Stop reason: HOSPADM

## 2021-10-19 RX ORDER — KETOCONAZOLE 20 MG/G
CREAM TOPICAL DAILY
COMMUNITY

## 2021-10-19 RX ORDER — HYDROMORPHONE HYDROCHLORIDE 1 MG/ML
0.4 INJECTION, SOLUTION INTRAMUSCULAR; INTRAVENOUS; SUBCUTANEOUS EVERY 5 MIN PRN
Status: DISCONTINUED | OUTPATIENT
Start: 2021-10-19 | End: 2021-10-19 | Stop reason: HOSPADM

## 2021-10-19 RX ORDER — NALOXONE HYDROCHLORIDE 0.4 MG/ML
0.2 INJECTION, SOLUTION INTRAMUSCULAR; INTRAVENOUS; SUBCUTANEOUS
Status: DISCONTINUED | OUTPATIENT
Start: 2021-10-19 | End: 2021-10-19 | Stop reason: HOSPADM

## 2021-10-19 RX ORDER — ATROPA BELLADONNA AND OPIUM 16.2; 3 MG/1; MG/1
SUPPOSITORY RECTAL PRN
Status: DISCONTINUED | OUTPATIENT
Start: 2021-10-19 | End: 2021-10-19 | Stop reason: HOSPADM

## 2021-10-19 RX ORDER — DULOXETIN HYDROCHLORIDE 30 MG/1
30 CAPSULE, DELAYED RELEASE ORAL 2 TIMES DAILY
COMMUNITY

## 2021-10-19 RX ORDER — FENTANYL CITRATE 50 UG/ML
50 INJECTION, SOLUTION INTRAMUSCULAR; INTRAVENOUS EVERY 5 MIN PRN
Status: DISCONTINUED | OUTPATIENT
Start: 2021-10-19 | End: 2021-10-19 | Stop reason: HOSPADM

## 2021-10-19 RX ADMIN — PROPOFOL 40 MG: 10 INJECTION, EMULSION INTRAVENOUS at 10:02

## 2021-10-19 RX ADMIN — ONDANSETRON HYDROCHLORIDE 4 MG: 2 SOLUTION INTRAMUSCULAR; INTRAVENOUS at 09:50

## 2021-10-19 RX ADMIN — SODIUM CHLORIDE: 900 INJECTION, SOLUTION INTRAVENOUS at 09:58

## 2021-10-19 RX ADMIN — FENTANYL CITRATE 25 MCG: 50 INJECTION, SOLUTION INTRAMUSCULAR; INTRAVENOUS at 10:28

## 2021-10-19 RX ADMIN — FENTANYL CITRATE 25 MCG: 50 INJECTION, SOLUTION INTRAMUSCULAR; INTRAVENOUS at 10:18

## 2021-10-19 RX ADMIN — FENTANYL CITRATE 25 MCG: 50 INJECTION, SOLUTION INTRAMUSCULAR; INTRAVENOUS at 10:22

## 2021-10-19 RX ADMIN — CEFTRIAXONE SODIUM 1 G: 1 INJECTION, SOLUTION INTRAVENOUS at 09:52

## 2021-10-19 RX ADMIN — FENTANYL CITRATE 25 MCG: 50 INJECTION, SOLUTION INTRAMUSCULAR; INTRAVENOUS at 10:09

## 2021-10-19 RX ADMIN — LIDOCAINE HYDROCHLORIDE 60 MG: 20 INJECTION, SOLUTION INFILTRATION; PERINEURAL at 09:58

## 2021-10-19 RX ADMIN — FENTANYL CITRATE 25 MCG: 50 INJECTION, SOLUTION INTRAMUSCULAR; INTRAVENOUS at 10:25

## 2021-10-19 RX ADMIN — PROPOFOL 120 MG: 10 INJECTION, EMULSION INTRAVENOUS at 09:58

## 2021-10-19 ASSESSMENT — LIFESTYLE VARIABLES: TOBACCO_USE: 1

## 2021-10-19 ASSESSMENT — MIFFLIN-ST. JEOR: SCORE: 1738.65

## 2021-10-19 ASSESSMENT — ENCOUNTER SYMPTOMS: DYSRHYTHMIAS: 1

## 2021-10-19 NOTE — ANESTHESIA POSTPROCEDURE EVALUATION
Patient: Rupesh Muller    Procedure: Procedure(s):  Transurethral resection of bladder tumor, bilateral retrograde pyelograms       Diagnosis:Bladder tumor [D49.4]  Diagnosis Additional Information: No value filed.    Anesthesia Type:  General    Note:  Disposition: Outpatient   Postop Pain Control: Uneventful            Sign Out: Well controlled pain   PONV: No   Neuro/Psych: Uneventful            Sign Out: Acceptable/Baseline neuro status   Airway/Respiratory: Uneventful            Sign Out: Acceptable/Baseline resp. status   CV/Hemodynamics: Uneventful            Sign Out: Acceptable CV status; No obvious hypovolemia; No obvious fluid overload   Other NRE: NONE   DID A NON-ROUTINE EVENT OCCUR? No           Last vitals:  Vitals Value Taken Time   /64 10/19/21 1120   Temp 97.4  F (36.3  C) 10/19/21 1120   Pulse 60 10/19/21 1121   Resp 59 10/19/21 1122   SpO2 92 % 10/19/21 1123   Vitals shown include unvalidated device data.    Electronically Signed By: PAULINA AMBRIZ CRNA  October 19, 2021  12:47 PM

## 2021-10-19 NOTE — DISCHARGE INSTRUCTIONS
[facility]  Adult Discharge Orders & Instructions      For 24 hours after surgery:  1. Get plenty of rest.  A responsible adult must stay with you for at least 24 hours after you leave the hospital.   2. You may feel lightheaded.  IF so, sit for a few minutes before standing.  Have someone help you get up.   3. You may have a slight fever. Call the doctor if your fever is over 101 F (38.3 C) (taken under the tongue) or lasts longer than 24 hours.  4. You may have a dry mouth, a sore throat, muscle aches or trouble sleeping.  These should go away after 24 hours.  5. Do not make important or legal decisions.  6.   Do not drive or use heavy equipment.  If you have weakness or tingling, don't drive or use heavy equipment until this feeling goes away.                                                                                                                                                                         To contact a doctor, call    [phone]______________            Message was left for urology clinic to call lynne at the [facility] to set an appointment on Monday  To go over pathology results.  Restart xarelto when urine is clear to light pink

## 2021-10-19 NOTE — OR NURSING
Patients only complaint  Is feels like has urge to void  Catheter is patent and flowing  Red urine with small clots

## 2021-10-19 NOTE — ANESTHESIA CARE TRANSFER NOTE
Patient: Rupesh Muller    Procedure: Procedure(s):  Transurethral resection of bladder tumor, bilateral retrograde pyelograms       Diagnosis: Bladder tumor [D49.4]  Diagnosis Additional Information: No value filed.    Anesthesia Type:   General     Note:      Level of Consciousness: drowsy  Oxygen Supplementation: face mask    Independent Airway: airway patency satisfactory and stable  Dentition: dentition unchanged  Vital Signs Stable: post-procedure vital signs reviewed and stable  Report to RN Given: handoff report given  Patient transferred to: PACU    Handoff Report: Identifed the Patient, Identified the Reponsible Provider, Reviewed the pertinent medical history, Discussed the surgical course, Reviewed Intra-OP anesthesia mangement and issues during anesthesia, Set expectations for post-procedure period and Allowed opportunity for questions and acknowledgement of understanding      Vitals:  Vitals Value Taken Time   BP     Temp     Pulse     Resp     SpO2         Electronically Signed By: PAULINA MATHEWS CRNA  October 19, 2021  11:00 AM

## 2021-10-19 NOTE — ANESTHESIA PROCEDURE NOTES
Airway       Patient location during procedure: OR       Procedure Start/Stop Times: 10/19/2021 10:03 AM and 10/19/2021 10:03 AM  Staff -        CRNA: Juan Clayton APRN CRNA       Performed By: CRNAIndications and Patient Condition       Indications for airway management: amanda-procedural       Induction type:intravenous       Mask difficulty assessment: 1 - vent by mask    Final Airway Details       Final airway type: supraglottic airway    Supraglottic Airway Details        Type: LMA       Brand: I-Gel       LMA size: 4    Post intubation assessment        Placement verified by: capnometry        Number of attempts at approach: 1       Secured with: silk tape       Ease of procedure: easy       Dentition: Intact and Unchanged

## 2021-10-19 NOTE — OR NURSING
PACU Transfer Note    Rupesh Muller was transferred to 730 via cart.  Equipment used for transport:  none.  Accompanied by:  Dale RN   Prescriptions were: none    PACU Respiratory Event Documentation     1) Episodes of Apnea greater than or equal to 10 seconds: no    2) Bradypnea - less than 8 breaths per minute: no    3) Pain score on 0 to 10 scale: 0/10    4) Pain-sedation mismatch (yes or no): no    5) Repeated 02 desaturation less than 90% (yes or no): no    Anesthesia notified? (yes or no): no    Any of the above events occuring repeatedly in separate 30 minute intervals may be considered recurrent PACU respiratory events.    Patient stable and meets phase 1 discharge criteria for transport from PACU.    Handoff given to JOHNATHON Cain

## 2021-10-19 NOTE — ANESTHESIA PREPROCEDURE EVALUATION
Anesthesia Pre-Procedure Evaluation    Patient: Rupesh Muller   MRN: [id number] : 1940        Preoperative Diagnosis: Bladder tumor [D49.4]    Procedure : Procedure(s):  Transurethral resection of bladder tumor, bilateral retrograde pyelograms and possible intravesical gemcitabine          Past Medical History:   Diagnosis Date     Atrial fibrillation (H) with ventricular ectopic beats      Basal cell carcinoma nodular type 2012     Bigeminy      BPH (benign prostatic hyperplasia)      Chronic atrial fibrillation with prolonged pauses      chronic recurrent ingrown toe nails      Congestive heart failure (H)      Diabetes mellitus type 2 in obese (H)      Heart failure with preserved ejection fraction (H)      Hyperlipidemia      Hypertension      Hyponatremia      Interstitial lung disease (H)      Morbid obesity (H)      Pericardial effusion      Peripheral neuropathy 2006     Pulmonary fibrosis (H)      Sick sinus syndrome (H)     pacemaker placed 2016     Trigeminy epidsodes       Past Surgical History:   Procedure Laterality Date     CARDIAC CATHERIZATION Right 10/16/2018     CYSTOSCOPY, TRANSURETHRAL RESECTION (TUR) PROSTATE, COMBINED N/A 2018    Procedure: Transurethral Resection of Prostate;  Surgeon: Jarad Richey MD;  Location: [facility] OR     descended right testicle  1985     EXC SKIN MALIG 0.5CM OR LESS,FACIAL  2012    BCC tip of nose     HERNIA REPAIR  1985     pace maker      [employer] Accolade Model #L310 Serial #[id number]     pacemaker placement  2018     paricardiocentesis  10/16/2018     REMOVAL OF NAIL BED Bilateral       Allergies   Allergen Reactions     No Clinical Screening - See Comments Itching     CT scan in [place] about 30 years ago -   Not likely an allergy per patient (18)      Social History     Tobacco Use     Smoking status: Former Smoker     Types: Cigarettes     Quit date: 1992     Years since quittin.8     Smokeless  tobacco: Never Used   Substance Use Topics     Alcohol use: No      Wt Readings from Last 1 Encounters:   11/29/18 110.4 kg (243 lb 6.4 oz)        Anesthesia Evaluation   Pt has had prior anesthetic.     No history of anesthetic complications       ROS/MED HX  ENT/Pulmonary: Comment: Pulmonary fibrosis    (+) tobacco use, Past use,     Neurologic:  - neg neurologic ROS     Cardiovascular:     (+) hypertension-----CHF Last EF: 55% pacemaker, dysrhythmias, a-fib,     METS/Exercise Tolerance: 1 - Eating, dressing    Hematologic:  - neg hematologic  ROS     Musculoskeletal: Comment: Hx of recent fall, currently in rehab in [place].  Complaining of LLE weakness today, more than in recent days.  Thinks he may have slept on it last night.      GI/Hepatic:  - neg GI/hepatic ROS     Renal/Genitourinary:  - neg Renal ROS     Endo:     (+) type II DM, Obesity,     Psychiatric/Substance Use:  - neg psychiatric ROS     Infectious Disease:  - neg infectious disease ROS     Malignancy:  - neg malignancy ROS     Other:  - neg other ROS          Physical Exam    Airway        Mallampati: II   TM distance: > 3 FB   Neck ROM: full   Mouth opening: > 3 cm    Respiratory Devices and Support         Dental  no notable dental history         Cardiovascular   cardiovascular exam normal       Rhythm and rate: regular and normal     Pulmonary   pulmonary exam normal        breath sounds clear to auscultation           OUTSIDE LABS:  CBC: No results found for: WBC, HGB, HCT, PLT  BMP: No results found for: NA, POTASSIUM, CHLORIDE, CO2, BUN, CR, GLC  COAGS: No results found for: PTT, INR, FIBR  POC: No results found for: BGM, HCG, HCGS  HEPATIC: No results found for: ALBUMIN, PROTTOTAL, ALT, AST, GGT, ALKPHOS, BILITOTAL, BILIDIRECT, NAHID  OTHER: No results found for: PH, LACT, A1C, JORDAN, PHOS, MAG, LIPASE, AMYLASE, TSH, T4, T3, CRP, SED    Anesthesia Plan    ASA Status:  3   NPO Status:  NPO Appropriate    Anesthesia Type: General.     -  Airway: LMA   Induction: Propofol.           Consents    Anesthesia Plan(s) and associated risks, benefits, and realistic alternatives discussed. Questions answered and patient/representative(s) expressed understanding.     - Discussed with:  Patient      - Extended Intubation/Ventilatory Support Discussed: No.      - Patient is DNR/DNI Status: No    Use of blood products discussed: Yes.     - Discussed with: Patient.     - Consented: consented to blood products            Reason for refusal: other.     Postoperative Care    Pain management: IV analgesics, Multi-modal analgesia.   PONV prophylaxis: Ondansetron (or other 5HT-3)     Comments:                PAULINA AMBRIZ CRNA

## 2021-10-19 NOTE — OP NOTE
Preoperative diagnosis  Bladder tumor    Postoperative diagnosis  Bladder tumor    Procedure performed  Transurethral resection of bladder tumor, >5cm  Cystoscopy with bilateral  retrograde pyelogram  Interpretation of retrograde, bilateral    Surgeon  Jarad Richey MD    Surgeon(s)/Proceduralist(s) and Assistants (if any)  Surgeon(s):  Jarad Richey MD  Circulator: Mone Ryan RN  Scrub Person: Peter Harris  First Assistant: Shelbie Tyson RN    Specimen(s)  Yes- Bladder tumor    (EBL) Estimated blood loss (ml)  15    Anesthesia  General    Complications  None    Findings  Multifocal tumor.  5.5cm papillary bladder tumor along right lateral wall  2cm posterior bladder tumor  Bladder tumor involving bladder neck from 10:00 to 2:00  All 3 tumors were papillary    Left retrograde pyelogram revealed a delicate system with normal caliber ureter with no filling defects.    No filling defects with the renal pelvis or calyces.    Right retrograde pyelogram revealed a delicate system with normal caliber ureter with no filling defects.    No filling defects with the renal pelvis or calyces.    Indications  81 year old male agreed to undergo the above named procedure after discussion of the alternatives, risks and benefits.    The patient was found to have a bladder tumor on cystoscopy for work up of hematuria.  Informed consent was obtained.      Procedure  The patient was taken to the operating room and placed supine on the operating table.  Pre-operative antibiotics were administered and bilateral lower extremity SCDs were placed.    After induction of general anesthesia the patient was positioned in dorsal lithotomy.    A time-out was performed and the patient was prepped and draped in a sterile fashion.      Serial dilation of the meatus was performed to 30 Hebrew in order to allow the scope to safely be passed through the urethra.    A 22 Hebrew rigid cystoscope was introduced into the bladder under  direct vision and cystoscopy was performed.    A Sensor wire was passed through the right  ureteral orifice and a 5 Djiboutian catheter was passed over the wire and the wire was removed.    A retrograde pyelogram was performed by slowly injecting Omnipaque contrast, 5 mL, through the 5 Djiboutian catheter.    The entire collecting system was fluoroscopically visualized with findings described above.      The 5 Djiboutian catheter was then passed through the left ureteral orifice and the same procedure was performed as described above.    The entire collecting system was fluoroscopically visualized with findings described above.      A 28 Djiboutian continuous flow resectoscope was introduced into the bladder.   Using a working element, the described mass was resected.    This was felt to be a complete resection of visible tumor.    Hemostasis was confirmed and a 20F catheter was placed in a sterile fashion.      The patient tolerated the procedure well, was awakened, extubated and transferred to the recovery room in stable condition.    Plan  Follow up in clinic this upcoming Monday for a pathology review.

## 2021-10-19 NOTE — OR NURSING
Message left for urology to call lynne at  [facility] to set up  Appointment  For monday to talk about pathology findings

## 2021-10-25 ENCOUNTER — OFFICE VISIT (OUTPATIENT)
Dept: UROLOGY | Facility: OTHER | Age: 81
End: 2021-10-25
Attending: UROLOGY
Payer: MEDICARE

## 2021-10-25 VITALS
HEART RATE: 61 BPM | OXYGEN SATURATION: 99 % | RESPIRATION RATE: 16 BRPM | DIASTOLIC BLOOD PRESSURE: 68 MMHG | TEMPERATURE: 97.3 F | SYSTOLIC BLOOD PRESSURE: 124 MMHG

## 2021-10-25 DIAGNOSIS — C67.1 MALIGNANT NEOPLASM OF DOME OF URINARY BLADDER (H): Primary | ICD-10-CM

## 2021-10-25 PROCEDURE — G0463 HOSPITAL OUTPT CLINIC VISIT: HCPCS

## 2021-10-25 PROCEDURE — 51798 US URINE CAPACITY MEASURE: CPT | Performed by: UROLOGY

## 2021-10-25 PROCEDURE — 99215 OFFICE O/P EST HI 40 MIN: CPT | Mod: 25 | Performed by: UROLOGY

## 2021-10-25 PROCEDURE — 51700 IRRIGATION OF BLADDER: CPT | Performed by: UROLOGY

## 2021-10-25 PROCEDURE — G0463 HOSPITAL OUTPT CLINIC VISIT: HCPCS | Mod: 25

## 2021-10-25 ASSESSMENT — PAIN SCALES - GENERAL: PAINLEVEL: NO PAIN (0)

## 2021-10-25 NOTE — PROGRESS NOTES
Type of Visit  EST    Chief Complaint  Bladder cancer    HPI  Mr. Muller is a 81 year old male who is status post TURBT of a bladder mass.  He is doing well and denies significant gross hematuria.   He denies fevers or chills.  Patient follows up today for void trial and pathology review.      Review of Systems  I reviewed the ROS with the patient.    Nursing Notes:   Jessenia Floyd LPN  10/25/2021  9:41 AM  Addendum  Chief Complaint   Patient presents with     Procedure     Void Trial    Patient presents to the clinic today for a procedure for a Void Trial    Review of Systems:    Weight loss:    No     Recent fever/chills:  No   Night sweats:   No  Current skin rash:  No   Recent hair loss:  No  Heat intolerance:  No   Cold intolerance:  No  Chest pain:   No   Palpitations:   No  Shortness of breath:  No   Wheezing:   No  Constipation:    No   Diarrhea:   No   Nausea:   No   Vomiting:   No   Kidney/side pain:  No   Back pain:   No  Frequent headaches:  No   Dizziness:     No  Leg swelling:   No   Calf pain:    No  Void Trial  Verbal order read back by Jarad Richey MD to perform void trial and post void residual bladder scan.  Patient's bladder was filled under gravity with 150mL of sterile saline.  Patient voided 0mL.  Post-void residual was measured by ultrasonic bladder scanner: 130 mL  Pt was unable to void in clinic.  Pt was advised to increase fluid intake throughout the morning and void several times. Patient was sent back to [facility] with the intent of the cathing him if he is unable to by this afternoon,    Jessenia Floyd LPN  10/25/2021 9:10 AM      Medication Reconciliation: completed   Jessenia Floyd LPN  10/25/2021 8:45 AM       Physical Exam  /68 (BP Location: Right arm, Patient Position: Sitting, Cuff Size: Adult Large)   Pulse 61   Temp 97.3  F (36.3  C) (Temporal)   Resp 16   SpO2 99%   Constitutional: NAD, WDWN.  Cardiovascular: Regular rate.  Pulmonary/Chest: Respirations  "are even and non-labored bilaterally.  Abdominal: Soft. No distension, tenderness, masses or guarding. No CVA tenderness.  Extremities: ALINE x 4, Warm. No clubbing.  No cyanosis.    Skin: Pink, warm and dry.  No rashes noted.    Pathology  I personally reviewed the pathology report  10/19/2021  High grade T1 (muscularis propria was present and uninvolved in specimen)  \"superficial lamina propria\"    Assessment  Mr. Muller is a 81 year old male who is status post TURBT revealing bladder cancer.    Patient passed void trial.  Reviewed the pathology report in detail.  We discussed the different clinical categories for bladder cancer including low risk, intermediate risk and high risk.  Explained how the risk stratification impacts intensity of follow-up and need for intravesical therapy.  This patient has high risk bladder cancer.    We discussed the concepts of recurrence and progression and bladder cancer.    Discussed the surveillance cystoscopy schedule for high risk bladder cancer.    Discussed the value of a intravesical BCG as well as re-resection given the higher likelihood of residual cancer.  Son and patient prefer to defer and I explained this is very reasonable considering the clinical circumstances    Plan  Follow up for surveillance cystoscopy q3 months until 10/2023 then q6 months until 10/2025 then once annually every fall.              A total of 40 minutes (exclusive of separately billed services/procedures) was spent with the patient, reviewing records, tests, ordering medications, tests or procedures, counseling regarding the above described medical concern, recommendations regarding management and documenting clinical information in the EHR.   "

## 2021-10-25 NOTE — NURSING NOTE
Chief Complaint   Patient presents with     Procedure     Void Trial    Patient presents to the clinic today for a procedure for a Void Trial    Review of Systems:    Weight loss:    No     Recent fever/chills:  No   Night sweats:   No  Current skin rash:  No   Recent hair loss:  No  Heat intolerance:  No   Cold intolerance:  No  Chest pain:   No   Palpitations:   No  Shortness of breath:  No   Wheezing:   No  Constipation:    No   Diarrhea:   No   Nausea:   No   Vomiting:   No   Kidney/side pain:  No   Back pain:   No  Frequent headaches:  No   Dizziness:     No  Leg swelling:   No   Calf pain:    No  Void Trial  Verbal order read back by Jarad Richey MD to perform void trial and post void residual bladder scan.  Patient's bladder was filled under gravity with 150mL of sterile saline.  Patient voided 0mL.  Post-void residual was measured by ultrasonic bladder scanner: 130 mL  Pt was unable to void in clinic.  Pt was advised to increase fluid intake throughout the morning and void several times. Patient was sent back to [facility] with the intent of the cathing him if he is unable to by this afternoon,    Jessenia Floyd LPN  10/25/2021 9:10 AM      Medication Reconciliation: completed   Jessenia Floyd LPN  10/25/2021 8:45 AM

## 2021-10-27 PROBLEM — C67.1 MALIGNANT NEOPLASM OF DOME OF URINARY BLADDER (H): Status: ACTIVE | Noted: 2021-10-27

## 2021-10-27 PROBLEM — R33.9 URINARY RETENTION: Status: RESOLVED | Noted: 2018-11-13 | Resolved: 2021-10-27

## 2021-10-27 LAB
PATH REPORT.COMMENTS IMP SPEC: NORMAL
PATH REPORT.FINAL DX SPEC: NORMAL
PHOTO IMAGE: NORMAL

## 2021-11-10 ENCOUNTER — TELEPHONE (OUTPATIENT)
Dept: UROLOGY | Facility: OTHER | Age: 81
End: 2021-11-10
Payer: MEDICARE

## 2021-11-10 NOTE — TELEPHONE ENCOUNTER
Bridgette states that Rupesh fell and is not very mobile and was wondering if they should hold off on void trial  I explained that it would be best to hold void trial until patient is back to [place].  Bridgette was an agreement with plan

## 2022-01-01 ENCOUNTER — ALLIED HEALTH/NURSE VISIT (OUTPATIENT)
Dept: UROLOGY | Facility: OTHER | Age: 82
End: 2022-01-01
Attending: UROLOGY
Payer: MEDICARE

## 2022-01-01 VITALS — HEART RATE: 64 BPM | TEMPERATURE: 97.5 F | OXYGEN SATURATION: 95 % | RESPIRATION RATE: 16 BRPM

## 2022-01-01 DIAGNOSIS — N40.1 URINARY RETENTION DUE TO BENIGN PROSTATIC HYPERPLASIA: ICD-10-CM

## 2022-01-01 DIAGNOSIS — R33.8 URINARY RETENTION DUE TO BENIGN PROSTATIC HYPERPLASIA: ICD-10-CM

## 2022-01-01 PROCEDURE — 51702 INSERT TEMP BLADDER CATH: CPT | Performed by: UROLOGY

## 2022-01-01 PROCEDURE — 51702 INSERT TEMP BLADDER CATH: CPT

## 2022-01-01 ASSESSMENT — PAIN SCALES - GENERAL: PAINLEVEL: NO PAIN (0)

## 2022-05-26 ENCOUNTER — TRANSFERRED RECORDS (OUTPATIENT)
Dept: HEALTH INFORMATION MANAGEMENT | Facility: OTHER | Age: 82
End: 2022-05-26
Payer: MEDICARE

## 2022-05-31 ENCOUNTER — MEDICAL CORRESPONDENCE (OUTPATIENT)
Dept: HEALTH INFORMATION MANAGEMENT | Facility: OTHER | Age: 82
End: 2022-05-31
Payer: MEDICARE

## 2022-06-06 ENCOUNTER — OFFICE VISIT (OUTPATIENT)
Dept: UROLOGY | Facility: OTHER | Age: 82
End: 2022-06-06
Attending: UROLOGY
Payer: MEDICARE

## 2022-06-06 VITALS — HEART RATE: 94 BPM | OXYGEN SATURATION: 94 % | RESPIRATION RATE: 16 BRPM

## 2022-06-06 DIAGNOSIS — N40.1 URINARY RETENTION DUE TO BENIGN PROSTATIC HYPERPLASIA: Primary | ICD-10-CM

## 2022-06-06 DIAGNOSIS — C67.9 RECURRENT MALIGNANT NEOPLASM OF BLADDER (H): ICD-10-CM

## 2022-06-06 DIAGNOSIS — R33.8 URINARY RETENTION DUE TO BENIGN PROSTATIC HYPERPLASIA: Primary | ICD-10-CM

## 2022-06-06 PROCEDURE — G0463 HOSPITAL OUTPT CLINIC VISIT: HCPCS | Mod: 25

## 2022-06-06 PROCEDURE — 99214 OFFICE O/P EST MOD 30 MIN: CPT | Mod: 25 | Performed by: UROLOGY

## 2022-06-06 PROCEDURE — 52000 CYSTOURETHROSCOPY: CPT | Performed by: UROLOGY

## 2022-06-06 RX ORDER — CEFTRIAXONE SODIUM 2 G/50ML
2 INJECTION, SOLUTION INTRAVENOUS
Status: CANCELLED | OUTPATIENT
Start: 2022-06-06

## 2022-06-06 ASSESSMENT — PAIN SCALES - GENERAL: PAINLEVEL: NO PAIN (0)

## 2022-06-06 NOTE — NURSING NOTE
Chief Complaint   Patient presents with     Procedure     Cysto   patient presents to the clinic today for a procedure for a Cysto    Patient positioned in supine position, perineum area prepped with chlorhexidene Gluconate and patient draped per sterile technique. Per verbal order read back by Jarad Richey MD, Urojet 10mL 2% lidocaine jelly to be instilled into urethra.  Urojet- 10ml 2% Lidocaine jelly instilled into the urethra.    Urojet 2%  Lot#: [id number]  Expiration date: 09/23  : [employer]  NDC: [address]-7    [place] Protocol    A. Pre-procedure verification complete Yes  1-relevant information / documentation available, reviewed and properly matched to the patient; 2-consent accurate and complete, 3-equipment and supplies available    B. Site marking complete Yes  Site marked if not in continuous attendance with patient    C. TIME OUT completed Yes  Time Out was conducted just prior to starting procedure to verify the eight required elements: 1-patient identity, 2-consent accurate and complete, 3-position, 4-correct side/site marked (if applicable), 5-procedure, 6-relevant images / results properly labeled and displayed (if applicable), 7-antibiotics / irrigation fluids (if applicable), 8-safety precautions.    After procedure perineum area rinsed. Discharge instructions reviewed with patient. Patient verbalized understanding of discharge instructions and discharged ambulatory.  Jessenia Floyd LPN..................6/6/2022  9:31 AM      Medication Reconciliation: completed   Jessenia Floyd LPN  6/6/2022 9:30 AM

## 2022-06-06 NOTE — PROGRESS NOTES
Type of Visit  EST    Chief Complaint  Urinary retention  History of bladder cancer    HPI  Mr. Muller is a 82 year old male with history of bladder cancer and urinary retention who presents for catheter exchange.  The patient underwent an initial diagnostic TURBT in October 2021.  Pathology revealed a higher grade bladder tumor that was superficial.  The TURBT cured him from bladder cancer.  He has not followed up for surveillance cystoscopies since that time however.  He does continue to undergo catheter exchanges monthly due to retention.  Catheter placement has been quite challenging so he presents today for cystoscopy.  No changes in symptoms and his health is stable since last visit.      Past Medical History  He  has a past medical history of Atrial fibrillation (H) with ventricular ectopic beats, Basal cell carcinoma nodular type (09/27/2012), Bigeminy, BPH (benign prostatic hyperplasia), Chronic atrial fibrillation with prolonged pauses, chronic recurrent ingrown toe nails, Congestive heart failure (H), Diabetes mellitus type 2 in obese (H), Heart failure with preserved ejection fraction (H), Hyperlipidemia, Hypertension, Hyponatremia, Interstitial lung disease (H), Morbid obesity (H), Pericardial effusion, Peripheral neuropathy (08/2006), Pulmonary fibrosis (H), Sick sinus syndrome (H), and Trigeminy epidsodes.  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    Medications  He has a current medication list which includes the following prescription(s): acetaminophen, amlodipine, vitamin c, bisacodyl, bisacodyl, onetouch verio iq, blood glucose monitoring, calcitonin (salmon), calcium carbonate, cholecalciferol, docusate sodium, duloxetine, finasteride, flomax, gabapentin, gabapentin, hydrocortisone, hydrocortisone, insulin aspart, insulin glargine, insulin pen needle, ketoconazole, lidocaine, magnesium oxide, omeprazole, oxycodone, potassium chloride er, potassium chloride er, prednisone, rivaroxaban anticoagulant, rivaroxaban anticoagulant, simvastatin, spironolactone, tamsulosin, and torsemide.    Allergies  Allergies   Allergen Reactions     No Clinical Screening - See Comments Itching     CT scan in [place] about 30 years ago -   Not likely an allergy per patient (11/13/18)       Social History  He  reports that he quit smoking about 30 years ago. His smoking use included cigarettes. He has never used smokeless tobacco. He reports that he does not drink alcohol.  No drug abuse.    Family History  No family history on file.    Review of Systems  I personally reviewed the ROS with the patient.    Nursing Notes:   Jessenia Floyd LPN  6/6/2022  9:34 AM  Signed  Chief Complaint   Patient presents with     Procedure     Cysto   patient presents to the clinic today for a procedure for a Cysto    Patient positioned in supine position, perineum area prepped with chlorhexidene Gluconate and patient draped per sterile  technique. Per verbal order read back by Jarad Richey MD, Urojet 10mL 2% lidocaine jelly to be instilled into urethra.  Urojet- 10ml 2% Lidocaine jelly instilled into the urethra.    Urojet 2%  Lot#: [id number]  Expiration date: 09/23  : [employer]  NDC: [address]-8    [place] Protocol    A. Pre-procedure verification complete Yes  1-relevant information / documentation available, reviewed and properly matched to the patient; 2-consent accurate and complete, 3-equipment and supplies available    B. Site marking complete Yes  Site marked if not in continuous attendance with patient    C. TIME OUT completed Yes  Time Out was conducted just prior to starting procedure to verify the eight required elements: 1-patient identity, 2-consent accurate and complete, 3-position, 4-correct side/site marked (if applicable), 5-procedure, 6-relevant images / results properly labeled and displayed (if applicable), 7-antibiotics / irrigation fluids (if applicable), 8-safety precautions.    After procedure perineum area rinsed. Discharge instructions reviewed with patient. Patient verbalized understanding of discharge instructions and discharged ambulatory.  Jessenia Floyd LPN..................6/6/2022  9:31 AM      Medication Reconciliation: completed   Jessenia Floyd LPN  6/6/2022 9:30 AM     Weight loss:    No     Recent fever/chills:  No   Night sweats:   No  Current skin rash:  No   Recent hair loss:  No  Heat intolerance:  No   Cold intolerance:  No  Chest pain:   No   Palpitations:   No  Shortness of breath:  No   Wheezing:   No  Constipation:    No   Diarrhea:   No   Nausea:   No   Vomiting:   No   Kidney/side pain:  No   Back pain:   No  Frequent headaches:  No   Dizziness:     No  Leg swelling:   No   Calf pain:    No    Physical Exam  Vitals:    06/06/22 0936   Pulse: 94   Resp: 16   SpO2: 94%     Constitutional: No acute distress.  Alert and cooperative   Head: NCAT  Eyes: Conjunctivae normal  Cardiovascular:  Regular rate.  Pulmonary/Chest: Respirations are even and non-labored bilaterally, no audible wheezing  Abdominal: Soft. No distension, tenderness, masses or guarding.   Neurological: A + O x 3.  Cranial Nerves II-XII grossly intact.  Extremities: ALINE x 4, Warm. No clubbing.  No cyanosis.    Skin: Pink, warm and dry.  No visible rashes noted.  Psychiatric:  Normal mood and affect  Back:  No left CVA tenderness.  No right CVA tenderness.  Genitourinary:  Nonpalpable bladder    ^^^^^^^^^^^^^^^^^^^^^^^^^^^^^^^^^^^^^^^^^^^^^^^^^^^^^^^^^^^^^^^^^^^^^^^^^^^^^^^^  Preprocedure diagnosis  Difficult to pass catheter  History of bladder cancer    Postprocedure diagnosis  Difficult to pass catheter  History of bladder cancer  Recurrent bladder tumor at the bladder neck    Procedure  Flexible Cystourethroscopy    Surgeon  Jarad Richey MD    Anesthesia  2% lidocaine jelly intraurethrally    Complications  None    Indications  82 year old male undergoing a flexible cystoscopy for the above mentioned indications.    Findings  Cystoscopic findings included a normal anterior urethra.    Approaching the bladder neck there was a bladder tumor located at the 6 o'clock position.  This was clearly obstructing safe catheter placement.  The bladder appeared to be normal capacity.    There were no tumors, stones or foreign bodies.    The orifices were slit-shaped and in their normal location.    Procedure  The patient was placed in supine position and prepped and draped in sterile fashion with lidocaine jelly per urethra for anesthesia.    I passed a lubricated 14F flexible cystoscope through the penile urethra and into the bladder and the bladder was completely visualized.  The cystoscope was retroflexed and the bladder neck and prostate visualized.    The cystoscope was slowly withdrawn while visualizing the urethra and the procedure terminated.    The patient tolerated the procedure well.   "    ^^^^^^^^^^^^^^^^^^^^^^^^^^^^^^^^^^^^^^^^^^^^^^^^^^^^^^^^^^^^^^^^^^^^^^^^^^^^^^^^    Pathology  I personally reviewed the pathology report  10/19/2021  High grade T1 (muscularis propria was present and uninvolved in specimen)  \"superficial lamina propria\"    Assessment & Plan  Mr. Muller is a 82 year old male with history of bladder cancer and urinary retention who presents for catheter exchange.    I recommended formal resection of the bladder tumor identified on cystoscopy.  I explained that this is a procedure performed in the operating room with anesthesia.  I explained the risks, benefits and alternatives to the procedure.  Specifically I discussed the potential for bladder perforation which could lead to conversion to open abdominal surgery to repair the bladder.  I also discussed that when tumors are close to the ureteral orifice there is a chance I may need to leave a stent during the period of healing.    All patient's questions were answered and the patient was consented for the procedure listed below.     Plan  The patient was consented for \"transurethral resection of bladder tumor\"   27 F resectoscope   Muscle paralysis is not requested during anesthesia due to the size/location of tumor.   Preop H&P per PCP for perioperative medication management is appreciated   We will plan follow-up path review at 1 month following surgery in order to perform the first catheter exchange without additional equipment to confirm that catheter exchange can be safely done at [place]  "

## 2022-06-06 NOTE — PATIENT INSTRUCTIONS

## 2022-06-17 NOTE — ADDENDUM NOTE
Addended by: GABBY HOUSTON on: 11/28/2018 02:30 PM     Modules accepted: Orders     28-May-2022 11:48

## 2022-06-22 ENCOUNTER — TRANSFERRED RECORDS (OUTPATIENT)
Dept: HEALTH INFORMATION MANAGEMENT | Facility: OTHER | Age: 82
End: 2022-06-22

## 2022-06-22 LAB
CREATININE (EXTERNAL): 0.5 MG/DL (ref 0.7–1.4)
GFR ESTIMATED (EXTERNAL): >90 ML/MIN/1.73M2
GLUCOSE (EXTERNAL): 208 MG/DL (ref 64–112)
POTASSIUM (EXTERNAL): 4.4 MEQ/L (ref 3.5–5.3)

## 2022-06-25 ENCOUNTER — TRANSFERRED RECORDS (OUTPATIENT)
Dept: HEALTH INFORMATION MANAGEMENT | Facility: CLINIC | Age: 82
End: 2022-06-25

## 2022-06-27 ENCOUNTER — ANESTHESIA EVENT (OUTPATIENT)
Dept: SURGERY | Facility: OTHER | Age: 82
End: 2022-06-27
Payer: MEDICARE

## 2022-06-28 ENCOUNTER — HOSPITAL ENCOUNTER (OUTPATIENT)
Facility: OTHER | Age: 82
Discharge: HOME | End: 2022-06-28
Attending: UROLOGY | Admitting: UROLOGY
Payer: MEDICARE

## 2022-06-28 ENCOUNTER — ANESTHESIA (OUTPATIENT)
Dept: SURGERY | Facility: OTHER | Age: 82
End: 2022-06-28
Payer: MEDICARE

## 2022-06-28 VITALS
RESPIRATION RATE: 16 BRPM | BODY MASS INDEX: 32.15 KG/M2 | OXYGEN SATURATION: 92 % | WEIGHT: 230.5 LBS | DIASTOLIC BLOOD PRESSURE: 83 MMHG | TEMPERATURE: 97.4 F | HEART RATE: 58 BPM | SYSTOLIC BLOOD PRESSURE: 156 MMHG

## 2022-06-28 LAB
GLUCOSE BLDC GLUCOMTR-MCNC: 119 MG/DL (ref 70–99)
GLUCOSE BLDC GLUCOMTR-MCNC: 126 MG/DL (ref 70–99)

## 2022-06-28 PROCEDURE — 99100 ANES PT EXTEME AGE<1 YR&>70: CPT | Performed by: NURSE ANESTHETIST, CERTIFIED REGISTERED

## 2022-06-28 PROCEDURE — 710N000010 HC RECOVERY PHASE 1, LEVEL 2, PER MIN: Performed by: UROLOGY

## 2022-06-28 PROCEDURE — 258N000001 HC RX 258: Performed by: UROLOGY

## 2022-06-28 PROCEDURE — 999N000141 HC STATISTIC PRE-PROCEDURE NURSING ASSESSMENT: Performed by: UROLOGY

## 2022-06-28 PROCEDURE — 52235 CYSTOSCOPY AND TREATMENT: CPT | Performed by: NURSE ANESTHETIST, CERTIFIED REGISTERED

## 2022-06-28 PROCEDURE — 710N000012 HC RECOVERY PHASE 2, PER MINUTE: Performed by: UROLOGY

## 2022-06-28 PROCEDURE — 360N000076 HC SURGERY LEVEL 3, PER MIN: Performed by: UROLOGY

## 2022-06-28 PROCEDURE — 52235 CYSTOSCOPY AND TREATMENT: CPT | Performed by: UROLOGY

## 2022-06-28 PROCEDURE — 272N000001 HC OR GENERAL SUPPLY STERILE: Performed by: UROLOGY

## 2022-06-28 PROCEDURE — 250N000009 HC RX 250: Performed by: NURSE ANESTHETIST, CERTIFIED REGISTERED

## 2022-06-28 PROCEDURE — 82962 GLUCOSE BLOOD TEST: CPT

## 2022-06-28 PROCEDURE — 250N000011 HC RX IP 250 OP 636: Performed by: UROLOGY

## 2022-06-28 PROCEDURE — 250N000025 HC SEVOFLURANE, PER MIN: Performed by: UROLOGY

## 2022-06-28 PROCEDURE — 370N000017 HC ANESTHESIA TECHNICAL FEE, PER MIN: Performed by: UROLOGY

## 2022-06-28 PROCEDURE — 88307 TISSUE EXAM BY PATHOLOGIST: CPT

## 2022-06-28 PROCEDURE — 250N000011 HC RX IP 250 OP 636: Performed by: NURSE ANESTHETIST, CERTIFIED REGISTERED

## 2022-06-28 PROCEDURE — 258N000003 HC RX IP 258 OP 636: Performed by: NURSE ANESTHETIST, CERTIFIED REGISTERED

## 2022-06-28 RX ORDER — MEPERIDINE HYDROCHLORIDE 50 MG/ML
12.5 INJECTION INTRAMUSCULAR; INTRAVENOUS; SUBCUTANEOUS
Status: DISCONTINUED | OUTPATIENT
Start: 2022-06-28 | End: 2022-06-28 | Stop reason: HOSPADM

## 2022-06-28 RX ORDER — OXYCODONE HYDROCHLORIDE 5 MG/1
5 TABLET ORAL EVERY 4 HOURS PRN
Status: DISCONTINUED | OUTPATIENT
Start: 2022-06-28 | End: 2022-06-28 | Stop reason: HOSPADM

## 2022-06-28 RX ORDER — SODIUM CHLORIDE 9 MG/ML
INJECTION, SOLUTION INTRAVENOUS CONTINUOUS
Status: DISCONTINUED | OUTPATIENT
Start: 2022-06-28 | End: 2022-06-28 | Stop reason: HOSPADM

## 2022-06-28 RX ORDER — LIDOCAINE 40 MG/G
CREAM TOPICAL
Status: DISCONTINUED | OUTPATIENT
Start: 2022-06-28 | End: 2022-06-28 | Stop reason: HOSPADM

## 2022-06-28 RX ORDER — ONDANSETRON 2 MG/ML
INJECTION INTRAMUSCULAR; INTRAVENOUS PRN
Status: DISCONTINUED | OUTPATIENT
Start: 2022-06-28 | End: 2022-06-28

## 2022-06-28 RX ORDER — NALOXONE HYDROCHLORIDE 0.4 MG/ML
0.2 INJECTION, SOLUTION INTRAMUSCULAR; INTRAVENOUS; SUBCUTANEOUS
Status: DISCONTINUED | OUTPATIENT
Start: 2022-06-28 | End: 2022-06-28 | Stop reason: HOSPADM

## 2022-06-28 RX ORDER — ONDANSETRON 4 MG/1
4 TABLET, ORALLY DISINTEGRATING ORAL EVERY 30 MIN PRN
Status: DISCONTINUED | OUTPATIENT
Start: 2022-06-28 | End: 2022-06-28 | Stop reason: HOSPADM

## 2022-06-28 RX ORDER — FENTANYL CITRATE 50 UG/ML
INJECTION, SOLUTION INTRAMUSCULAR; INTRAVENOUS PRN
Status: DISCONTINUED | OUTPATIENT
Start: 2022-06-28 | End: 2022-06-28

## 2022-06-28 RX ORDER — NALOXONE HYDROCHLORIDE 0.4 MG/ML
0.4 INJECTION, SOLUTION INTRAMUSCULAR; INTRAVENOUS; SUBCUTANEOUS
Status: DISCONTINUED | OUTPATIENT
Start: 2022-06-28 | End: 2022-06-28 | Stop reason: HOSPADM

## 2022-06-28 RX ORDER — FENTANYL CITRATE 50 UG/ML
50 INJECTION, SOLUTION INTRAMUSCULAR; INTRAVENOUS EVERY 5 MIN PRN
Status: DISCONTINUED | OUTPATIENT
Start: 2022-06-28 | End: 2022-06-28 | Stop reason: HOSPADM

## 2022-06-28 RX ORDER — ONDANSETRON 2 MG/ML
4 INJECTION INTRAMUSCULAR; INTRAVENOUS EVERY 30 MIN PRN
Status: DISCONTINUED | OUTPATIENT
Start: 2022-06-28 | End: 2022-06-28 | Stop reason: HOSPADM

## 2022-06-28 RX ORDER — CEFTRIAXONE SODIUM 2 G/50ML
2 INJECTION, SOLUTION INTRAVENOUS
Status: COMPLETED | OUTPATIENT
Start: 2022-06-28 | End: 2022-06-28

## 2022-06-28 RX ORDER — LIDOCAINE HYDROCHLORIDE 20 MG/ML
INJECTION, SOLUTION INFILTRATION; PERINEURAL PRN
Status: DISCONTINUED | OUTPATIENT
Start: 2022-06-28 | End: 2022-06-28

## 2022-06-28 RX ORDER — FENTANYL CITRATE 50 UG/ML
25 INJECTION, SOLUTION INTRAMUSCULAR; INTRAVENOUS
Status: DISCONTINUED | OUTPATIENT
Start: 2022-06-28 | End: 2022-06-28 | Stop reason: HOSPADM

## 2022-06-28 RX ORDER — PROPOFOL 10 MG/ML
INJECTION, EMULSION INTRAVENOUS PRN
Status: DISCONTINUED | OUTPATIENT
Start: 2022-06-28 | End: 2022-06-28

## 2022-06-28 RX ORDER — HYDROMORPHONE HYDROCHLORIDE 1 MG/ML
0.4 INJECTION, SOLUTION INTRAMUSCULAR; INTRAVENOUS; SUBCUTANEOUS EVERY 5 MIN PRN
Status: DISCONTINUED | OUTPATIENT
Start: 2022-06-28 | End: 2022-06-28 | Stop reason: HOSPADM

## 2022-06-28 RX ADMIN — FENTANYL CITRATE 25 MCG: 50 INJECTION, SOLUTION INTRAMUSCULAR; INTRAVENOUS at 10:45

## 2022-06-28 RX ADMIN — LIDOCAINE HYDROCHLORIDE 40 MG: 20 INJECTION, SOLUTION INFILTRATION; PERINEURAL at 10:45

## 2022-06-28 RX ADMIN — FENTANYL CITRATE 50 MCG: 50 INJECTION, SOLUTION INTRAMUSCULAR; INTRAVENOUS at 10:56

## 2022-06-28 RX ADMIN — SODIUM CHLORIDE: 900 INJECTION, SOLUTION INTRAVENOUS at 10:27

## 2022-06-28 RX ADMIN — FENTANYL CITRATE 25 MCG: 50 INJECTION, SOLUTION INTRAMUSCULAR; INTRAVENOUS at 10:54

## 2022-06-28 RX ADMIN — PROPOFOL 30 MG: 10 INJECTION, EMULSION INTRAVENOUS at 10:57

## 2022-06-28 RX ADMIN — PROPOFOL 140 MG: 10 INJECTION, EMULSION INTRAVENOUS at 10:45

## 2022-06-28 RX ADMIN — ONDANSETRON HYDROCHLORIDE 4 MG: 2 SOLUTION INTRAMUSCULAR; INTRAVENOUS at 10:45

## 2022-06-28 RX ADMIN — PROPOFOL 30 MG: 10 INJECTION, EMULSION INTRAVENOUS at 10:56

## 2022-06-28 RX ADMIN — CEFTRIAXONE SODIUM 2 G: 2 INJECTION, SOLUTION INTRAVENOUS at 10:34

## 2022-06-28 ASSESSMENT — ENCOUNTER SYMPTOMS: DYSRHYTHMIAS: 1

## 2022-06-28 ASSESSMENT — LIFESTYLE VARIABLES: TOBACCO_USE: 1

## 2022-06-28 NOTE — ANESTHESIA CARE TRANSFER NOTE
Patient: Rupesh Muller    Procedure: Procedure(s):  Transurethral resection of bladder tumor       Diagnosis: Urinary retention due to benign prostatic hyperplasia [N40.1, R33.8]  Recurrent malignant neoplasm of bladder (H) [C67.9]  Diagnosis Additional Information: No value filed.    Anesthesia Type:   General     Note:    Oropharynx: spontaneously breathing (LMA in place)  Level of Consciousness: drowsy    Level of Supplemental Oxygen (L/min / FiO2): 6      Vital Signs Stable: post-procedure vital signs reviewed and stable  Report to RN Given: handoff report given  Patient transferred to: PACU    Handoff Report: Identifed the Patient, Identified the Reponsible Provider, Reviewed the pertinent medical history, Discussed the surgical course, Reviewed Intra-OP anesthesia mangement and issues during anesthesia, Set expectations for post-procedure period and Allowed opportunity for questions and acknowledgement of understanding      Vitals:  Vitals Value Taken Time   BP     Temp     Pulse 60 06/28/22 1116   Resp 13 06/28/22 1116   SpO2 95 % 06/28/22 1116   Vitals shown include unvalidated device data.    Electronically Signed By: PAULINA BOSTON CRNA  June 28, 2022  11:17 AM

## 2022-06-28 NOTE — OP NOTE
Preoperative diagnosis  Bladder tumor  Urinary retention    Postoperative diagnosis  Bladder tumor  Urinary retention    Procedure performed  Transurethral resection of bladder tumor, 2-5cm    Surgeon  Jarad Richey MD    Surgeon(s)/Proceduralist(s) and Assistants (if any)  Surgeon(s):  Jarad Richey MD  Circulator: Augusta Yeager RN  Scrub Person: Malinda Roach  First Assistant: Shelbie Tyson RN    Specimen(s)  Yes- Bladder tumor    (EBL) Estimated blood loss (ml)  20    Anesthesia  General    Complications  None    Findings  3cm papillary bladder tumor located midline trigone.  Tissue creates an obstructing flap of tissue at the bladder neck which appears to be the anatomic issue preventing routine catheter exchanges.  The flap extended transverse between the two ureteral orifices at the bladder neck.  Flap contained papillary tissue however it could represent bullous edema from indwelling Ng.  After the case was complete the flap had been resected and catheter placement was without resistance.    Indications  82 year old male agreed to undergo the above named procedure after discussion of the alternatives, risks and benefits.    The patient was found to have a bladder tumor on cystoscopy.  The patient also has had challenging catheter exchanges.  The goal for resection is both to provide an easier path for catheter exchanges as well as diagnostically to assess if tissue represents recurrent bladder cancer.    Procedure  The patient was taken to the operating room and placed supine on the operating table.  Pre-operative antibiotics were administered and bilateral lower extremity SCDs were placed.    After induction of general anesthesia the patient was positioned in dorsal lithotomy.    A time-out was performed and the patient was prepped and draped in a sterile fashion.      A 27 Luxembourgish continuous flow resectoscope was introduced into the bladder.   Using a working element, the described mass was  resected.    This was felt to be a complete resection of visible tumor.    Hemostasis was confirmed and an 18 F catheter was placed in a sterile fashion.   Catheter placement was without resistance and easy.     The patient tolerated the procedure well, was awakened and transferred to the recovery room in stable condition.    Plan  Follow up in clinic in 1 month for catheter exchange and path review on a Tuesday in nurse schedule.

## 2022-06-28 NOTE — ANESTHESIA PROCEDURE NOTES
Airway       Patient location during procedure: OR       Procedure Start/Stop Times: 6/28/2022 10:46 AM  Staff -        CRNA: Linsey Kamara APRN CRNA       Performed By: CRNA  Consent for Airway        Urgency: elective  Indications and Patient Condition       Indications for airway management: amanda-procedural       Induction type:intravenous       Mask difficulty assessment: 0 - not attempted    Final Airway Details       Final airway type: supraglottic airway    Supraglottic Airway Details        Type: LMA       Brand: I-Gel       LMA size: 4    Post intubation assessment        Placement verified by: capnometry        Number of attempts at approach: 1       Ease of procedure: easy       Dentition: Intact

## 2022-06-28 NOTE — DISCHARGE INSTRUCTIONS
[facility]  Adult Discharge Orders & Instructions      For 24 hours after surgery:  Get plenty of rest.  A responsible adult must stay with you for at least 24 hours after you leave the hospital.   You may feel lightheaded.  IF so, sit for a few minutes before standing.  Have someone help you get up.   You may have a slight fever. Call the doctor if your fever is over 101 F (38.3 C) (taken under the tongue) or lasts longer than 24 hours.  You may have a dry mouth, a sore throat, muscle aches or trouble sleeping.  These should go away after 24 hours.  Do not make important or legal decisions.  6.   Do not drive or use heavy equipment.  If you have weakness or tingling, don't drive or use heavy equipment until this feeling goes away.                                                                                                                                                                         To contact a doctor, call    [phone]______________

## 2022-06-28 NOTE — ANESTHESIA PREPROCEDURE EVALUATION
Anesthesia Pre-Procedure Evaluation    Patient: Rupesh Muller   MRN: [id number] : 1940        Preoperative Diagnosis: Bladder tumor [D49.4]    Procedure : Procedure(s):  Transurethral resection of bladder tumor          Past Medical History:   Diagnosis Date     Atrial fibrillation (H) with ventricular ectopic beats      Basal cell carcinoma nodular type 2012     Bigeminy      BPH (benign prostatic hyperplasia)      Chronic atrial fibrillation with prolonged pauses      chronic recurrent ingrown toe nails      Congestive heart failure (H)      Diabetes mellitus type 2 in obese (H)      Heart failure with preserved ejection fraction (H)      Hyperlipidemia      Hypertension      Hyponatremia      Interstitial lung disease (H)      Morbid obesity (H)      Pericardial effusion      Peripheral neuropathy 2006     Pulmonary fibrosis (H)      Sick sinus syndrome (H)     pacemaker placed 2016     Trigeminy epidsodes       Past Surgical History:   Procedure Laterality Date     CARDIAC CATHERIZATION Right 10/16/2018     CYSTOSCOPY, TRANSURETHRAL RESECTION (TUR) PROSTATE, COMBINED N/A 2018    Procedure: Transurethral Resection of Prostate;  Surgeon: Jarad Richey MD;  Location: [facility] OR     CYSTOSCOPY, TRANSURETHRAL RESECTION (TUR) TUMOR BLADDER INSTILL CHEMOTHERAPY, COMBINED Bilateral 10/19/2021    Procedure: Transurethral resection of bladder tumor, bilateral retrograde pyelograms;  Surgeon: Jarad Richey MD;  Location: [facility] OR     descended right testicle  1985     EXC SKIN MALIG 0.5CM OR LESS,FACIAL  2012    BCC tip of nose     HERNIA REPAIR  1985     pace maker      Viyet Accolade Model #L310 Serial #[id number]     pacemaker placement  2018     paricardiocentesis  10/16/2018     REMOVAL OF NAIL BED Bilateral       Allergies   Allergen Reactions     Contrast Dye Itching     CT scan in [place] about 30 years ago     No Clinical Screening - See Comments Itching     CT scan  in [place] about 30 years ago -   Not likely an allergy per patient (18)      Social History     Tobacco Use     Smoking status: Former Smoker     Types: Cigarettes     Quit date: 1992     Years since quittin.5     Smokeless tobacco: Never Used   Substance Use Topics     Alcohol use: No      Wt Readings from Last 1 Encounters:   10/19/21 101.2 kg (223 lb)        Anesthesia Evaluation   Pt has had prior anesthetic.     No history of anesthetic complications       ROS/MED HX  ENT/Pulmonary: Comment: Pulmonary fibrosis    (+) tobacco use, Past use,     Neurologic:  - neg neurologic ROS     Cardiovascular:     (+) hypertension-----CHF Last EF: 55% pacemaker, dysrhythmias, a-fib,     METS/Exercise Tolerance: 1 - Eating, dressing    Hematologic:  - neg hematologic  ROS     Musculoskeletal: Comment:  Complaining of LLE weakness today, this is normal for him and at baseline today.      GI/Hepatic:  - neg GI/hepatic ROS     Renal/Genitourinary:  - neg Renal ROS     Endo:     (+) type II DM, Obesity,     Psychiatric/Substance Use:  - neg psychiatric ROS     Infectious Disease:  - neg infectious disease ROS     Malignancy:  - neg malignancy ROS     Other:  - neg other ROS          Physical Exam    Airway        Mallampati: II   TM distance: > 3 FB   Neck ROM: full   Mouth opening: > 3 cm    Respiratory Devices and Support         Dental  no notable dental history         Cardiovascular   cardiovascular exam normal       Rhythm and rate: regular and normal     Pulmonary   pulmonary exam normal        breath sounds clear to auscultation           OUTSIDE LABS:  CBC: No results found for: WBC, HGB, HCT, PLT  BMP: No results found for: NA, POTASSIUM, CHLORIDE, CO2, BUN, CR, GLC  COAGS: No results found for: PTT, INR, FIBR  POC: No results found for: BGM, HCG, HCGS  HEPATIC: No results found for: ALBUMIN, PROTTOTAL, ALT, AST, GGT, ALKPHOS, BILITOTAL, BILIDIRECT, NAHID  OTHER: No results found for: PH, LACT, A1C, JORDAN,  PHOS, MAG, LIPASE, AMYLASE, TSH, T4, T3, CRP, SED    Anesthesia Plan    ASA Status:  3   NPO Status:  NPO Appropriate    Anesthesia Type: General.     - Airway: LMA   Induction: Propofol.           Consents    Anesthesia Plan(s) and associated risks, benefits, and realistic alternatives discussed. Questions answered and patient/representative(s) expressed understanding.    - Discussed:     - Discussed with:  Patient      - Extended Intubation/Ventilatory Support Discussed: No.      - Patient is DNR/DNI Status: No    Use of blood products discussed: Yes.     - Discussed with: Patient.     - Consented: consented to blood products            Reason for refusal: other.     Postoperative Care    Pain management: IV analgesics, Multi-modal analgesia.   PONV prophylaxis: Ondansetron (or other 5HT-3)     Comments:                    PAULINA AMBRIZ CRNA

## 2022-06-28 NOTE — INTERVAL H&P NOTE
"I have reviewed the surgical (or preoperative) H&P that is linked to this encounter, and examined the patient. There are no significant changes    Clinical Conditions Present on Arrival:  Clinically Significant Risk Factors Present on Admission                 # Coagulation Defect: home medication list includes an anticoagulant medication   # Cachexia: Estimated body mass index is 32.15 kg/m  as calculated from the following:    Height as of 10/19/21: 1.803 m (5' 11\").    Weight as of this encounter: 104.6 kg (230 lb 8 oz).       "

## 2022-06-28 NOTE — ANESTHESIA POSTPROCEDURE EVALUATION
Patient: Rupesh Muller    Procedure: Procedure(s):  Transurethral resection of bladder tumor       Anesthesia Type:  General    Note:  Disposition: Outpatient   Postop Pain Control: Uneventful            Sign Out: Well controlled pain   PONV: No   Neuro/Psych: Uneventful            Sign Out: Acceptable/Baseline neuro status   Airway/Respiratory: Uneventful            Sign Out: Acceptable/Baseline resp. status   CV/Hemodynamics: Uneventful            Sign Out: Acceptable CV status; No obvious hypovolemia; No obvious fluid overload   Other NRE: NONE   DID A NON-ROUTINE EVENT OCCUR? No           Last vitals:  Vitals Value Taken Time   /75 06/28/22 1135   Temp 97.5  F (36.4  C) 06/28/22 1125   Pulse 60 06/28/22 1135   Resp 10 06/28/22 1135   SpO2 93 % 06/28/22 1136   Vitals shown include unvalidated device data.    Electronically Signed By: PAULINA BOSTON CRNA  June 28, 2022  1:21 PM

## 2022-06-30 LAB
PATH REPORT.COMMENTS IMP SPEC: NORMAL
PATH REPORT.FINAL DX SPEC: NORMAL
PATH REPORT.RELEVANT HX SPEC: NORMAL
PHOTO IMAGE: NORMAL

## 2022-07-26 ENCOUNTER — ALLIED HEALTH/NURSE VISIT (OUTPATIENT)
Dept: UROLOGY | Facility: OTHER | Age: 82
End: 2022-07-26
Attending: UROLOGY
Payer: MEDICARE

## 2022-07-26 DIAGNOSIS — R33.8 URINARY RETENTION DUE TO BENIGN PROSTATIC HYPERPLASIA: Primary | ICD-10-CM

## 2022-07-26 DIAGNOSIS — N40.1 URINARY RETENTION DUE TO BENIGN PROSTATIC HYPERPLASIA: Primary | ICD-10-CM

## 2022-07-26 PROCEDURE — 99213 OFFICE O/P EST LOW 20 MIN: CPT | Mod: 25 | Performed by: UROLOGY

## 2022-07-26 PROCEDURE — 51702 INSERT TEMP BLADDER CATH: CPT | Performed by: UROLOGY

## 2022-07-26 PROCEDURE — G0463 HOSPITAL OUTPT CLINIC VISIT: HCPCS | Performed by: UROLOGY

## 2022-07-26 NOTE — NURSING NOTE
Chief Complaint   Patient presents with     Procedure     Cath change   Patient presents to the clinic today for a catheter change and a follow up for Path review.  Review of Systems:    Weight loss:    No     Recent fever/chills:  No   Night sweats:   No  Current skin rash:  No   Recent hair loss:  No  Heat intolerance:  No   Cold intolerance:  No  Chest pain:   No   Palpitations:   No  Shortness of breath:  No   Wheezing:   No  Constipation:    No   Diarrhea:   No   Nausea:   No   Vomiting:   No   Kidney/side pain:  No   Back pain:   No  Frequent headaches:  No   Dizziness:     No  Leg swelling:   No   Calf pain:    No    Male Catheter Change  Patient came to the clinic for monthly catheter change. No problems noted. Old catheter removed. Uro jet used for local anesthesia. Patient prepped and draped in sterile manner. New 18 coude tip islas inserted with no problems, urine return noted. Will return to the clinic  for monthly catheter change. Jarad Richey MD   supervised the procedure.  Urojet 2%  Lot#: [id number]  Expiration date: 12/23  : [employer]  NDC: [address]-3  Jessenia Floyd LPN  7/26/2022 10:51 AM      Medication Reconciliation: completed   Jessenia Floyd LPN  7/26/2022 10:49 AM

## 2022-07-26 NOTE — PROGRESS NOTES
"Type of Visit  EST    Chief Complaint  Bladder cancer    HPI  Mr. Mluler is a 82 year old male who is status post TURBT of a bladder mass.  He is doing well and denies significant gross hematuria.   He denies fevers or chills.  Patient follows up today for void trial and pathology review and catheter exchange.      Review of Systems  I reviewed the ROS with the patient.    Nursing Notes:   Jessenia Floyd LPN  7/26/2022 10:52 AM  Signed  Chief Complaint   Patient presents with     Procedure     Cath change   Patient presents to the clinic today for a catheter change and a follow up for Path review.  Review of Systems:    Weight loss:    No     Recent fever/chills:  No   Night sweats:   No  Current skin rash:  No   Recent hair loss:  No  Heat intolerance:  No   Cold intolerance:  No  Chest pain:   No   Palpitations:   No  Shortness of breath:  No   Wheezing:   No  Constipation:    No   Diarrhea:   No   Nausea:   No   Vomiting:   No   Kidney/side pain:  No   Back pain:   No  Frequent headaches:  No   Dizziness:     No  Leg swelling:   No   Calf pain:    No    Male Catheter Change  Patient came to the clinic for monthly catheter change. No problems noted. Old catheter removed. Uro jet used for local anesthesia. Patient prepped and draped in sterile manner. New 18 coude tip islas inserted with no problems, urine return noted. Will return to the clinic  for monthly catheter change. Jarad Richey MD   supervised the procedure.  Urojet 2%  Lot#: [id number]  Expiration date: 12/23  : [employer]  NDC: [address]-7  Jessenia Floyd LPN  7/26/2022 10:51 AM      Medication Reconciliation: completed   Jessenia Floyd LPN  7/26/2022 10:49 AM       Physical Exam  Ht:  5'11\"  Wt:  230 lbs  RR: 14  HR:  64  Constitutional: NAD, WDWN.  Cardiovascular: Regular rate.  Pulmonary/Chest: Respirations are even and non-labored bilaterally.  Abdominal: Soft. No distension, tenderness, masses or guarding. No CVA " "tenderness.  Extremities: ALINE x 4, Warm. No clubbing.  No cyanosis.    Skin: Pink, warm and dry.  No rashes noted.    Pathology  I personally reviewed the pathology report  TURBT  7/19/2022  Negative for cancer    ^^^^^^^^^^^^^^^^^^^^^^^^^^^^^^    TURBT  10/19/2021  High grade T1 (muscularis propria was present and uninvolved in specimen)  \"superficial lamina propria\"    Assessment  Mr. Muller is a 82 year old male who is status post TURBT and catheter exchange.  Patient passed void trial.  Reviewed the pathology report in detail.    Discussed the surveillance cystoscopy schedule.    Plan  Resume catheter exchanges in [place] but follow up with us during cystoscopy months and we can exchange catheter at that time.  Follow up for surveillance cystoscopy q4 months until 7/2023 then q6 months until 7/2025 then once annually.  "

## 2022-08-23 NOTE — NURSING NOTE
Male Catheter Change  Patient came to the clinic for monthly catheter change. No problems noted. Old catheter removed. Uro jet used for local anesthesia. Patient prepped and draped in sterile manner. New 18 coude tip islas inserted with no problems, urine return noted. Will return to the clinic for monthly catheter change. Jarad Richey MD  supervised the procedure.  Urojet 2%  Lot#: [id number]  Expiration date: 1/2024  : [employer]  NDC: [address]-6

## (undated) DEVICE — GLOVE PROTEXIS POWDER FREE SMT 8.5 2D72PT85X

## (undated) DEVICE — CATH FOLEY COUDE TIEMAN 20FR 5ML LUBRICATH LATEX 0102L20

## (undated) DEVICE — PACK CYSTO SBA15CSFCA

## (undated) DEVICE — SYR 50ML CATH TIP W/O NDL 309620

## (undated) DEVICE — TUBING SUCTION 10'X3/16" N510

## (undated) DEVICE — SLEEVE COMPRESSION SCD KNEE MED 74022

## (undated) DEVICE — BAG URINARY DRAIN 2000ML LF 154002

## (undated) DEVICE — BAG URINARY DRAIN LEG MEDIUM 19OZ LF 150719

## (undated) DEVICE — SOL WATER 1500ML

## (undated) DEVICE — TEASPOON METAL STERILE 17506/24

## (undated) DEVICE — ESU ELEC LOOP HF-RESECTION 24FR MED 30 W/CABLE WA22606S

## (undated) DEVICE — PAD CHUX UNDERPAD 30X36" P3036C

## (undated) DEVICE — DEVICE CATH STABILIZATION STATLOCK FOLEY 2-WAY FOL0102

## (undated) DEVICE — GUIDEWIRE SENSOR DUAL FLEX STR 0.035"X150CM M0066703080

## (undated) DEVICE — TUBING EXTENSION FOLEY CATH W/CONNECTOR 9346

## (undated) DEVICE — CATH FOLEY COUDE TIEMAN 18FR 5ML LUBRICATH LATEX 0102L18

## (undated) DEVICE — ESU ELEC BAND RESECTION 24FR 30DEG W/CABLE WA22623C

## (undated) DEVICE — FASTENER LEGBAND CATHETER DALE 316

## (undated) DEVICE — BAG URINARY DRAIN 4000ML LF 153509

## (undated) DEVICE — Device

## (undated) RX ORDER — ONDANSETRON 2 MG/ML
INJECTION INTRAMUSCULAR; INTRAVENOUS
Status: DISPENSED
Start: 2021-10-19

## (undated) RX ORDER — LIDOCAINE HYDROCHLORIDE 10 MG/ML
INJECTION, SOLUTION EPIDURAL; INFILTRATION; INTRACAUDAL; PERINEURAL
Status: DISPENSED
Start: 2018-11-13

## (undated) RX ORDER — LIDOCAINE HYDROCHLORIDE 20 MG/ML
INJECTION, SOLUTION EPIDURAL; INFILTRATION; INTRACAUDAL; PERINEURAL
Status: DISPENSED
Start: 2021-10-19

## (undated) RX ORDER — LIDOCAINE HYDROCHLORIDE 20 MG/ML
INJECTION, SOLUTION EPIDURAL; INFILTRATION; INTRACAUDAL; PERINEURAL
Status: DISPENSED
Start: 2022-06-28

## (undated) RX ORDER — FENTANYL CITRATE 50 UG/ML
INJECTION, SOLUTION INTRAMUSCULAR; INTRAVENOUS
Status: DISPENSED
Start: 2022-06-28

## (undated) RX ORDER — ACETAMINOPHEN 10 MG/ML
INJECTION, SOLUTION INTRAVENOUS
Status: DISPENSED
Start: 2022-06-28

## (undated) RX ORDER — FENTANYL CITRATE 50 UG/ML
INJECTION, SOLUTION INTRAMUSCULAR; INTRAVENOUS
Status: DISPENSED
Start: 2018-11-13

## (undated) RX ORDER — CEFTRIAXONE SODIUM 1 G/50ML
INJECTION, SOLUTION INTRAVENOUS
Status: DISPENSED
Start: 2021-10-19

## (undated) RX ORDER — FENTANYL CITRATE 50 UG/ML
INJECTION, SOLUTION INTRAMUSCULAR; INTRAVENOUS
Status: DISPENSED
Start: 2021-10-19

## (undated) RX ORDER — ATROPA BELLADONNA AND OPIUM 16.2; 3 MG/1; MG/1
SUPPOSITORY RECTAL
Status: DISPENSED
Start: 2021-10-19

## (undated) RX ORDER — PROPOFOL 10 MG/ML
INJECTION, EMULSION INTRAVENOUS
Status: DISPENSED
Start: 2022-06-28

## (undated) RX ORDER — PROPOFOL 10 MG/ML
INJECTION, EMULSION INTRAVENOUS
Status: DISPENSED
Start: 2018-11-13

## (undated) RX ORDER — ONDANSETRON 2 MG/ML
INJECTION INTRAMUSCULAR; INTRAVENOUS
Status: DISPENSED
Start: 2022-06-28

## (undated) RX ORDER — ONDANSETRON 2 MG/ML
INJECTION INTRAMUSCULAR; INTRAVENOUS
Status: DISPENSED
Start: 2018-11-13

## (undated) RX ORDER — CEFTRIAXONE SODIUM 2 G/50ML
INJECTION, SOLUTION INTRAVENOUS
Status: DISPENSED
Start: 2022-06-28

## (undated) RX ORDER — LIDOCAINE HYDROCHLORIDE 20 MG/ML
INJECTION, SOLUTION EPIDURAL; INFILTRATION; INTRACAUDAL; PERINEURAL
Status: DISPENSED
Start: 2018-11-13

## (undated) RX ORDER — IOPAMIDOL 755 MG/ML
INJECTION, SOLUTION INTRAVASCULAR
Status: DISPENSED
Start: 2021-10-19

## (undated) RX ORDER — PROPOFOL 10 MG/ML
INJECTION, EMULSION INTRAVENOUS
Status: DISPENSED
Start: 2021-10-19